# Patient Record
Sex: MALE | Race: WHITE | NOT HISPANIC OR LATINO
[De-identification: names, ages, dates, MRNs, and addresses within clinical notes are randomized per-mention and may not be internally consistent; named-entity substitution may affect disease eponyms.]

---

## 2021-01-21 ENCOUNTER — APPOINTMENT (OUTPATIENT)
Dept: INTERNAL MEDICINE | Facility: CLINIC | Age: 86
End: 2021-01-21
Payer: COMMERCIAL

## 2021-01-21 ENCOUNTER — NON-APPOINTMENT (OUTPATIENT)
Age: 86
End: 2021-01-21

## 2021-01-21 VITALS
OXYGEN SATURATION: 99 % | WEIGHT: 167 LBS | TEMPERATURE: 96.8 F | SYSTOLIC BLOOD PRESSURE: 144 MMHG | BODY MASS INDEX: 26.84 KG/M2 | HEART RATE: 70 BPM | DIASTOLIC BLOOD PRESSURE: 82 MMHG | HEIGHT: 66 IN

## 2021-01-21 VITALS — SYSTOLIC BLOOD PRESSURE: 130 MMHG | DIASTOLIC BLOOD PRESSURE: 68 MMHG

## 2021-01-21 VITALS — SYSTOLIC BLOOD PRESSURE: 150 MMHG | DIASTOLIC BLOOD PRESSURE: 80 MMHG

## 2021-01-21 DIAGNOSIS — R04.0 EPISTAXIS: ICD-10-CM

## 2021-01-21 DIAGNOSIS — Z83.79 FAMILY HISTORY OF OTHER DISEASES OF THE DIGESTIVE SYSTEM: ICD-10-CM

## 2021-01-21 DIAGNOSIS — K57.32 DIVERTICULITIS OF LARGE INTESTINE W/OUT PERFORATION OR ABSCESS W/OUT BLEEDING: ICD-10-CM

## 2021-01-21 PROCEDURE — G0438: CPT | Mod: 25

## 2021-01-21 PROCEDURE — G0102: CPT

## 2021-01-21 PROCEDURE — 99072 ADDL SUPL MATRL&STAF TM PHE: CPT

## 2021-01-21 PROCEDURE — 93000 ELECTROCARDIOGRAM COMPLETE: CPT

## 2021-01-21 PROCEDURE — 36415 COLL VENOUS BLD VENIPUNCTURE: CPT

## 2021-01-21 NOTE — PHYSICAL EXAM
[Normal Sphincter Tone] : normal sphincter tone [No Mass] : no mass [Stool Occult Blood] : stool positive for occult blood [No Acute Distress] : no acute distress [Well Nourished] : well nourished [Well Developed] : well developed [Normal Sclera/Conjunctiva] : normal sclera/conjunctiva [Well-Appearing] : well-appearing [PERRL] : pupils equal round and reactive to light [EOMI] : extraocular movements intact [Normal Oropharynx] : the oropharynx was normal [Normal Outer Ear/Nose] : the outer ears and nose were normal in appearance [No JVD] : no jugular venous distention [No Lymphadenopathy] : no lymphadenopathy [Supple] : supple [Thyroid Normal, No Nodules] : the thyroid was normal and there were no nodules present [No Respiratory Distress] : no respiratory distress  [No Accessory Muscle Use] : no accessory muscle use [Normal Rate] : normal rate  [Clear to Auscultation] : lungs were clear to auscultation bilaterally [Regular Rhythm] : with a regular rhythm [Normal S1, S2] : normal S1 and S2 [No Murmur] : no murmur heard [No Carotid Bruits] : no carotid bruits [No Abdominal Bruit] : a ~M bruit was not heard ~T in the abdomen [No Varicosities] : no varicosities [Pedal Pulses Present] : the pedal pulses are present [No Edema] : there was no peripheral edema [No Palpable Aorta] : no palpable aorta [No Extremity Clubbing/Cyanosis] : no extremity clubbing/cyanosis [Soft] : abdomen soft [Non Tender] : non-tender [Non-distended] : non-distended [No Masses] : no abdominal mass palpated [No HSM] : no HSM [Normal Bowel Sounds] : normal bowel sounds [Anus Abnormality] : the anus and perineum were normal [Prostate Enlargement] : the prostate was not enlarged [Prostate Tenderness] : the prostate was not tender [No Prostate Nodules] : no prostate nodules [Normal Posterior Cervical Nodes] : no posterior cervical lymphadenopathy [Normal Anterior Cervical Nodes] : no anterior cervical lymphadenopathy [No CVA Tenderness] : no CVA  tenderness [No Spinal Tenderness] : no spinal tenderness [No Joint Swelling] : no joint swelling [Grossly Normal Strength/Tone] : grossly normal strength/tone [No Rash] : no rash [Coordination Grossly Intact] : coordination grossly intact [No Focal Deficits] : no focal deficits [Normal Gait] : normal gait [Deep Tendon Reflexes (DTR)] : deep tendon reflexes were 2+ and symmetric [Normal Affect] : the affect was normal [Normal Insight/Judgement] : insight and judgment were intact [FreeTextEntry1] : external hemhorroid

## 2021-01-21 NOTE — HEALTH RISK ASSESSMENT
[Yes] : Yes [4 or more  times a week (4 pts)] : 4 or more  times a week (4 points) [1 or 2 (0 pts)] : 1 or 2 (0 points) [Never (0 pts)] : Never (0 points) [No] : In the past 12 months have you used drugs other than those required for medical reasons? No [0] : 2) Feeling down, depressed, or hopeless: Not at all (0) [Smoke Detector] : smoke detector [Carbon Monoxide Detector] : carbon monoxide detector [Sunscreen] : uses sunscreen [Seat Belt] :  uses seat belt [No falls in past year] : Patient reported no falls in the past year [With Significant Other] : lives with significant other [Fully functional (bathing, dressing, toileting, transferring, walking, feeding)] : Fully functional (bathing, dressing, toileting, transferring, walking, feeding) [Fully functional (using the telephone, shopping, preparing meals, housekeeping, doing laundry, using] : Fully functional and needs no help or supervision to perform IADLs (using the telephone, shopping, preparing meals, housekeeping, doing laundry, using transportation, managing medications and managing finances) [With Patient/Caregiver] : With Patient/Caregiver [] : No [de-identified] : not as regular [de-identified] : reguy [JEX0Bxwlj] : 0 [EyeExamDate] : 10/2020 [Change in mental status noted] : No change in mental status noted [Guns at Home] : no guns at home [AdvancecareDate] : 1/21/21

## 2021-01-21 NOTE — HISTORY OF PRESENT ILLNESS
[FreeTextEntry1] : Pt here for check up [de-identified] : Relates recent nose bleeds and fleeting scalp pain in the last  few days

## 2021-01-25 LAB
25(OH)D3 SERPL-MCNC: 40.9 NG/ML
ALBUMIN SERPL ELPH-MCNC: 4.6 G/DL
ALP BLD-CCNC: 55 U/L
ALT SERPL-CCNC: 16 U/L
ANION GAP SERPL CALC-SCNC: 19 MMOL/L
APPEARANCE: CLEAR
AST SERPL-CCNC: 15 U/L
BACTERIA: NEGATIVE
BASOPHILS # BLD AUTO: 0.05 K/UL
BASOPHILS NFR BLD AUTO: 0.7 %
BILIRUB SERPL-MCNC: 0.4 MG/DL
BILIRUBIN URINE: NEGATIVE
BLOOD URINE: NEGATIVE
BUN SERPL-MCNC: 23 MG/DL
CALCIUM SERPL-MCNC: 9.9 MG/DL
CHLORIDE SERPL-SCNC: 105 MMOL/L
CHOLEST SERPL-MCNC: 172 MG/DL
CO2 SERPL-SCNC: 20 MMOL/L
COLOR: YELLOW
CREAT SERPL-MCNC: 1.64 MG/DL
EOSINOPHIL # BLD AUTO: 0.08 K/UL
EOSINOPHIL NFR BLD AUTO: 1.1 %
ESTIMATED AVERAGE GLUCOSE: 120 MG/DL
GLUCOSE QUALITATIVE U: NEGATIVE
GLUCOSE SERPL-MCNC: 72 MG/DL
HBA1C MFR BLD HPLC: 5.8 %
HCT VFR BLD CALC: 50.4 %
HDLC SERPL-MCNC: 64 MG/DL
HGB BLD-MCNC: 15.9 G/DL
HYALINE CASTS: 0 /LPF
IMM GRANULOCYTES NFR BLD AUTO: 0.6 %
KETONES URINE: NEGATIVE
LDLC SERPL CALC-MCNC: 86 MG/DL
LEUKOCYTE ESTERASE URINE: NEGATIVE
LYMPHOCYTES # BLD AUTO: 1.02 K/UL
LYMPHOCYTES NFR BLD AUTO: 14.5 %
MAN DIFF?: NORMAL
MCHC RBC-ENTMCNC: 30.3 PG
MCHC RBC-ENTMCNC: 31.5 GM/DL
MCV RBC AUTO: 96.2 FL
MICROSCOPIC-UA: NORMAL
MONOCYTES # BLD AUTO: 0.83 K/UL
MONOCYTES NFR BLD AUTO: 11.8 %
NEUTROPHILS # BLD AUTO: 5.01 K/UL
NEUTROPHILS NFR BLD AUTO: 71.3 %
NITRITE URINE: NEGATIVE
NONHDLC SERPL-MCNC: 109 MG/DL
PH URINE: 7
PLATELET # BLD AUTO: 257 K/UL
POTASSIUM SERPL-SCNC: 4.7 MMOL/L
POTASSIUM SERPL-SCNC: 4.7 MMOL/L
PROT SERPL-MCNC: 6.7 G/DL
PROTEIN URINE: NORMAL
PSA SERPL-MCNC: 11.3 NG/ML
RBC # BLD: 5.24 M/UL
RBC # FLD: 13.2 %
RED BLOOD CELLS URINE: 0 /HPF
SODIUM SERPL-SCNC: 144 MMOL/L
SPECIFIC GRAVITY URINE: 1.02
SQUAMOUS EPITHELIAL CELLS: 0 /HPF
T4 SERPL-MCNC: 7.2 UG/DL
TRIGL SERPL-MCNC: 115 MG/DL
TSH SERPL-ACNC: 0.92 UIU/ML
UROBILINOGEN URINE: NORMAL
WBC # FLD AUTO: 7.03 K/UL
WHITE BLOOD CELLS URINE: 0 /HPF

## 2021-04-21 ENCOUNTER — APPOINTMENT (OUTPATIENT)
Dept: INTERNAL MEDICINE | Facility: CLINIC | Age: 86
End: 2021-04-21

## 2021-09-13 ENCOUNTER — TRANSCRIPTION ENCOUNTER (OUTPATIENT)
Age: 86
End: 2021-09-13

## 2022-07-28 ENCOUNTER — APPOINTMENT (OUTPATIENT)
Dept: INTERNAL MEDICINE | Facility: CLINIC | Age: 87
End: 2022-07-28

## 2022-07-28 ENCOUNTER — NON-APPOINTMENT (OUTPATIENT)
Age: 87
End: 2022-07-28

## 2022-07-28 VITALS
DIASTOLIC BLOOD PRESSURE: 72 MMHG | SYSTOLIC BLOOD PRESSURE: 126 MMHG | OXYGEN SATURATION: 98 % | BODY MASS INDEX: 24.75 KG/M2 | TEMPERATURE: 97.1 F | HEIGHT: 66 IN | HEART RATE: 51 BPM | WEIGHT: 154 LBS

## 2022-07-28 DIAGNOSIS — Z23 ENCOUNTER FOR IMMUNIZATION: ICD-10-CM

## 2022-07-28 DIAGNOSIS — R51.9 HEADACHE, UNSPECIFIED: ICD-10-CM

## 2022-07-28 DIAGNOSIS — Z87.448 PERSONAL HISTORY OF OTHER DISEASES OF URINARY SYSTEM: ICD-10-CM

## 2022-07-28 PROCEDURE — 99397 PER PM REEVAL EST PAT 65+ YR: CPT | Mod: 25

## 2022-07-28 PROCEDURE — G0444 DEPRESSION SCREEN ANNUAL: CPT | Mod: 59

## 2022-07-28 PROCEDURE — G0102: CPT

## 2022-07-28 PROCEDURE — 90750 HZV VACC RECOMBINANT IM: CPT

## 2022-07-28 PROCEDURE — 93000 ELECTROCARDIOGRAM COMPLETE: CPT | Mod: 59

## 2022-07-28 PROCEDURE — 90471 IMMUNIZATION ADMIN: CPT

## 2022-07-28 PROCEDURE — 36415 COLL VENOUS BLD VENIPUNCTURE: CPT

## 2022-07-28 PROCEDURE — 99072 ADDL SUPL MATRL&STAF TM PHE: CPT

## 2022-07-28 PROCEDURE — 82271 OCCULT BLOOD OTHER SOURCES: CPT | Mod: QW

## 2022-07-28 RX ORDER — AMLODIPINE BESYLATE 5 MG/1
5 TABLET ORAL
Qty: 90 | Refills: 0 | Status: DISCONTINUED | COMMUNITY
Start: 2021-01-21 | End: 2022-07-28

## 2022-07-28 RX ORDER — ROSUVASTATIN CALCIUM 10 MG/1
10 TABLET, FILM COATED ORAL
Qty: 90 | Refills: 0 | Status: DISCONTINUED | COMMUNITY
Start: 2021-01-21 | End: 2022-07-28

## 2022-07-28 NOTE — HEALTH RISK ASSESSMENT
[Never] : Never [Yes] : Yes [2 - 4 times a month (2 pts)] : 2-4 times a month (2 points) [1 or 2 (0 pts)] : 1 or 2 (0 points) [Never (0 pts)] : Never (0 points) [No] : In the past 12 months have you used drugs other than those required for medical reasons? No [0] : 2) Feeling down, depressed, or hopeless: Not at all (0) [No falls in past year] : Patient reported no falls in the past year [PHQ-2 Negative - No further assessment needed] : PHQ-2 Negative - No further assessment needed [None] : None [] :  [Smoke Detector] : smoke detector [Carbon Monoxide Detector] : carbon monoxide detector [Seat Belt] :  uses seat belt [Sunscreen] : uses sunscreen [With Patient/Caregiver] : , with patient/caregiver [Patient reported colonoscopy was normal] : Patient reported colonoscopy was normal [de-identified] : treadmilll   [de-identified] : reg [AED0Nnhos] : 0 [EyeExamDate] : 01/2022 [Change in mental status noted] : No change in mental status noted [ColonoscopyDate] : 1/1/17 [ColonoscopyComments] : polyp [AdvancecareDate] : 7/2022

## 2022-07-28 NOTE — ASSESSMENT
[FreeTextEntry1] : gett tdap and pneumonia shot dates. \par return for second shingrx 2-6 months.\par All preventative measures were reviewed with the patient and the patient is due for and agrees to the following as outlined  in the plan  below.\par

## 2022-07-28 NOTE — PHYSICAL EXAM
[No Acute Distress] : no acute distress [Well Nourished] : well nourished [Well Developed] : well developed [Well-Appearing] : well-appearing [Normal Sclera/Conjunctiva] : normal sclera/conjunctiva [PERRL] : pupils equal round and reactive to light [EOMI] : extraocular movements intact [Normal Outer Ear/Nose] : the outer ears and nose were normal in appearance [Normal Oropharynx] : the oropharynx was normal [No JVD] : no jugular venous distention [No Lymphadenopathy] : no lymphadenopathy [Supple] : supple [No Respiratory Distress] : no respiratory distress  [No Accessory Muscle Use] : no accessory muscle use [Clear to Auscultation] : lungs were clear to auscultation bilaterally [Normal Rate] : normal rate  [Regular Rhythm] : with a regular rhythm [Normal S1, S2] : normal S1 and S2 [No Murmur] : no murmur heard [No Carotid Bruits] : no carotid bruits [No Abdominal Bruit] : a ~M bruit was not heard ~T in the abdomen [No Varicosities] : no varicosities [Pedal Pulses Present] : the pedal pulses are present [No Edema] : there was no peripheral edema [No Palpable Aorta] : no palpable aorta [No Extremity Clubbing/Cyanosis] : no extremity clubbing/cyanosis [Soft] : abdomen soft [Non Tender] : non-tender [Non-distended] : non-distended [No Masses] : no abdominal mass palpated [No HSM] : no HSM [Normal Bowel Sounds] : normal bowel sounds [No Mass] : no mass [Normal Posterior Cervical Nodes] : no posterior cervical lymphadenopathy [Normal Anterior Cervical Nodes] : no anterior cervical lymphadenopathy [No CVA Tenderness] : no CVA  tenderness [No Spinal Tenderness] : no spinal tenderness [No Joint Swelling] : no joint swelling [Grossly Normal Strength/Tone] : grossly normal strength/tone [No Rash] : no rash [Coordination Grossly Intact] : coordination grossly intact [No Focal Deficits] : no focal deficits [Normal Gait] : normal gait [Deep Tendon Reflexes (DTR)] : deep tendon reflexes were 2+ and symmetric [Normal Affect] : the affect was normal [Normal Insight/Judgement] : insight and judgment were intact [Stool Occult Blood] : stool negative for occult blood [de-identified] : thyroid enlarged [FreeTextEntry1] : enlarged non tender prostate

## 2022-07-31 LAB
ALBUMIN SERPL ELPH-MCNC: 4.9 G/DL
AMYLASE/CREAT SERPL: 162 U/L
ANION GAP SERPL CALC-SCNC: 12 MMOL/L
BUN SERPL-MCNC: 25 MG/DL
CALCIUM SERPL-MCNC: 9.8 MG/DL
CHLORIDE SERPL-SCNC: 103 MMOL/L
CO2 SERPL-SCNC: 26 MMOL/L
CREAT SERPL-MCNC: 1.59 MG/DL
EGFR: 42 ML/MIN/1.73M2
GLUCOSE SERPL-MCNC: 85 MG/DL
LPL SERPL-CCNC: 58 U/L
PHOSPHATE SERPL-MCNC: 3.1 MG/DL
POTASSIUM SERPL-SCNC: 4 MMOL/L
SODIUM SERPL-SCNC: 141 MMOL/L

## 2022-08-01 ENCOUNTER — NON-APPOINTMENT (OUTPATIENT)
Age: 87
End: 2022-08-01

## 2022-08-03 LAB — HBA1C MFR BLD HPLC: 6

## 2022-08-25 ENCOUNTER — NON-APPOINTMENT (OUTPATIENT)
Age: 87
End: 2022-08-25

## 2022-08-26 ENCOUNTER — APPOINTMENT (OUTPATIENT)
Age: 87
End: 2022-08-26

## 2022-08-26 PROCEDURE — 99441: CPT

## 2022-08-26 RX ORDER — AMOXICILLIN 500 MG/1
500 CAPSULE ORAL
Qty: 20 | Refills: 0 | Status: COMPLETED | COMMUNITY
Start: 2022-02-22 | End: 2022-08-26

## 2022-08-26 RX ORDER — TIMOLOL MALEATE 5 MG/ML
0.5 SOLUTION OPHTHALMIC
Qty: 5 | Refills: 0 | Status: COMPLETED | COMMUNITY
Start: 2022-02-24 | End: 2022-08-26

## 2022-09-14 ENCOUNTER — NON-APPOINTMENT (OUTPATIENT)
Age: 87
End: 2022-09-14

## 2022-10-17 ENCOUNTER — NON-APPOINTMENT (OUTPATIENT)
Age: 87
End: 2022-10-17

## 2022-10-17 ENCOUNTER — APPOINTMENT (OUTPATIENT)
Dept: UROLOGY | Facility: CLINIC | Age: 87
End: 2022-10-17

## 2022-10-17 DIAGNOSIS — Z86.69 PERSONAL HISTORY OF OTHER DISEASES OF THE NERVOUS SYSTEM AND SENSE ORGANS: ICD-10-CM

## 2022-10-17 DIAGNOSIS — Z78.9 OTHER SPECIFIED HEALTH STATUS: ICD-10-CM

## 2022-10-17 DIAGNOSIS — Z86.79 PERSONAL HISTORY OF OTHER DISEASES OF THE CIRCULATORY SYSTEM: ICD-10-CM

## 2022-10-17 DIAGNOSIS — Z80.9 FAMILY HISTORY OF MALIGNANT NEOPLASM, UNSPECIFIED: ICD-10-CM

## 2022-10-17 PROCEDURE — 99204 OFFICE O/P NEW MOD 45 MIN: CPT | Mod: 95

## 2022-10-17 RX ORDER — NIRMATRELVIR AND RITONAVIR 300-100 MG
20 X 150 MG & KIT ORAL
Qty: 20 | Refills: 0 | Status: COMPLETED | COMMUNITY
Start: 2022-08-26 | End: 2022-10-17

## 2022-10-17 NOTE — HISTORY OF PRESENT ILLNESS
[FreeTextEntry1] : Dear Dr. Mark Mcclelland (PCP) [facility]\par \par Thank you so much for the referral to help care for your patient.\par \par Chief Complaint: Elevated PSA\par Date of first visit: 10/17/2022\par Occupation: \par \par JAZZMINE WELDON  is a 87 year old  male with PMHx of HTN, HLD, and glaucoma, who presents for elevated PSA. His PSA is 12.3 ng/ml. Per patient, his PSA "has been elevated for years", but he does not remember any values. He was being followed by a urologist for his chronically elevated PSA values, but "he has not seen that doctor in years". An MRI done in 2019 was read as PIRADS 1. His PSAD is normal (0.12 ng/ml/cc). He is biopsy naive. He denies a family history of  malignancies. \par \par He offers minimal urinary complaints. He denies frequency, urgency, and incomplete bladder emptying. No leakage. He feels like his urinary stream has become weaker over the last few weeks. Nocturia x 2-3. He denies dysuria and hematuria. \par \par \par PSA History\par 12.3 ng/ml Free PSA- 19% 06/27/22\par 10.30 ng/ml Free PSA- 24% 03/16/22 \par 11.30 ng/ml 01/21/21\par \par MRI History\par MRI 08/21/2019.  98 cc prostate, MRI read as PIRADS 1.  No LAD No EPE, No Bony Lesions. The clinical implications discussed with the patient.\par \par \par Biopsy History \par N/A\par \par The patient denies fevers, chills, nausea and or vomiting and no unexplained weight loss.\par \par All pertinent laboratory results and physician notes were reviewed.  \par \par Prostate cancer screening: the patient and I spoke at length about prostate cancer screening, its risks and its benefits. The patient has 2 (age, elevated PSA) risk factors for prostate cancer.  He understands that many men with prostate cancer will die with the disease rather than of it and we also discussed the results large multi-center American and  prostate cancer screening trials. He also understands that PSA in and of itself does not diagnose prostate cancer but only assesses risk to a certain degree. The patient understands that to definitively screen for prostate cancer, a biopsy is required and this procedure has risks, including bleeding, infection, ED and urinary retention. The patient opted to proceed with a prostate MRI.\par

## 2022-10-17 NOTE — ASSESSMENT
[FreeTextEntry1] : 87 year old  male with PMHx of HTN, HLD, and glaucoma, who presents for elevated PSA. His PSA is 12.3 ng/ml. An MRI done in 2019 was read as PIRADS 1. His PSAD is normal (0.12 ng/ml/cc). He is biopsy naive. He denies a family history of  malignancies. Minimal LUTS.\par \par 1. Need to obtain MRI images from 2019 and upload to PACS. \par 2. Obtain Prostate MRI. Need prostate MRI for fusion biopsy.\par 3. Schedule MRI, MRI review appointment, TP OR biopsy, and post op follow up appointments with Dr. Cain.\par \par He understands that many men with prostate cancer will die with the disease rather than of it and we also discussed the results large multi-center American and  prostate cancer screening trials. He also understands that PSA in and of itself does not diagnose prostate cancer but only assesses risk to a certain degree. The patient understands that to definitively screen for prostate cancer, a biopsy is required and this procedure has risks, including bleeding, infection, ED and urinary retention. The patient opted to move forward with a prostate MRI.\par \par The patient is aware to expect hematuria x 2 weeks and up to 4 weeks of hematospermia.  There is a risk of infection albeit much lower than a transrectal approach. In some cases, patients can experience erectile dysfunction but this is usually self limiting.  Any fever/chills after the biopsy, the patient is to contact the office and go to the ER for an immediate evaluation. He has been given paper instructions outlining these items - which includes medications to avoid prior to surgery.\par \par \par 1. CBC, BMP, PSA, Covid Test, UA UCx, EKG, echo report\par 2. Medical and Cardiac Clearance\par 3. TP biopsy at [facility]\par 4. Follow up 2 weeks after biopsy with his primary urologist or ourselves.\par 5. We will call with the path results once they are resulted.\par \par Follow up in office for MRI review/physical exam prior to biopsy date given.\par \par Carmen Sharma NP was present and available for consult for the entirety of this visit.\par

## 2022-10-24 ENCOUNTER — APPOINTMENT (OUTPATIENT)
Dept: UROLOGY | Facility: CLINIC | Age: 87
End: 2022-10-24

## 2022-10-27 ENCOUNTER — OUTPATIENT (OUTPATIENT)
Dept: OUTPATIENT SERVICES | Facility: HOSPITAL | Age: 87
LOS: 1 days | End: 2022-10-27
Payer: COMMERCIAL

## 2022-10-27 ENCOUNTER — APPOINTMENT (OUTPATIENT)
Dept: MRI IMAGING | Facility: HOSPITAL | Age: 87
End: 2022-10-27

## 2022-10-27 PROCEDURE — 72197 MRI PELVIS W/O & W/DYE: CPT | Mod: 26

## 2022-10-27 PROCEDURE — 72197 MRI PELVIS W/O & W/DYE: CPT

## 2022-10-27 PROCEDURE — A9585: CPT

## 2022-11-02 ENCOUNTER — APPOINTMENT (OUTPATIENT)
Dept: UROLOGY | Facility: CLINIC | Age: 87
End: 2022-11-02

## 2022-11-02 VITALS
HEART RATE: 70 BPM | HEIGHT: 66 IN | DIASTOLIC BLOOD PRESSURE: 80 MMHG | SYSTOLIC BLOOD PRESSURE: 145 MMHG | WEIGHT: 148 LBS | TEMPERATURE: 98.1 F | OXYGEN SATURATION: 97 % | BODY MASS INDEX: 23.78 KG/M2

## 2022-11-02 PROCEDURE — 99072 ADDL SUPL MATRL&STAF TM PHE: CPT

## 2022-11-02 PROCEDURE — 99214 OFFICE O/P EST MOD 30 MIN: CPT

## 2022-11-02 NOTE — PHYSICAL EXAM
[General Appearance - Well Developed] : well developed [General Appearance - Well Nourished] : well nourished [Abdomen Soft] : soft [Edema] : no peripheral edema [] : no respiratory distress

## 2022-11-03 NOTE — ASSESSMENT
[FreeTextEntry1] : 87 year old  male with PMHx of HTN, HLD, and glaucoma, who presents for elevated PSA. His PSA is 12.3 ng/ml. An MRI done in 2019 was read as PIRADS 1. His PSAD is normal (0.12 ng/ml/cc). He is biopsy naive. He denies a family history of  malignancies. Minimal LUTS.\par \par The patient was referred for a PSA > 10, however there were no findings on the MRI and therefore low risk for CaP.  We dicsussed pros and cons of screening and we will not be screening.\par \par 1. MRI Negative\par \par The prostate MRI has been reviewed with the patient (images and report).  There are no suspicious lesions on 2nd look.  Reviewing multiple studies the probability of having prostate cancer with a negative prostate MRI is ~ 20%.  However, the probability of having clinically significant disease is <5% of those positive.  A recent study from [facility] presented level 1b evidence that a MRI can help avoid patients biopsies and only misses ~ 3% of clinically significant disease.  \par \par I have discussed these details at length with the patient.  He is aware of the pro's and con's of prostate cancer screening.  Taking into account his PSA, Family History and LISA findings.  He wishes at this time to avoid a biopsy and will continue to undergo PSA based screening. If the PSA continues to increase or there is increasing clinical suspicion we will repeat MR imaging of the prostate prior to any intervention, due to risks associated with the prostate biopsy. The patient understands that a prostate biopsy is still the standard of care with regards to screening and MRI is an adjunct that can help localize and target more suspicious areas.  In conclusion, he would like to hold off on the biopsy at this time.\par \par 2. BPH - no symptoms.\par \par Thank you very much for allowing me to assist in the care of this patient. Should you have any additional questions or concerns please do not hesitate to contact me.\par \par \par Sincerely,\par \par \par Jm Cain D.O.\par  of Urology and Radiology\par  of Urology at [facility]\par System Director for Prostate Cancer\par [address] [place] [address], 5th Floor [facility], [address]\par Phone: [phone]\par

## 2022-11-03 NOTE — HISTORY OF PRESENT ILLNESS
[FreeTextEntry1] : Dear Dr. Mark Mcclelland (PCP) [facility]\par Dear Dr. Kitty Pace ([facility]) - [address]\par \par Thank you so much for the referral to help care for your patient.\par \par Chief Complaint: Elevated PSA\par Date of first visit: 10/17/2022\par Occupation: \par \par JAZZMINE WELDON  is a 87 year old  male with PMHx of HTN, HLD, and glaucoma, who presents for elevated PSA. His PSA is 12.3 ng/ml. Per patient, his PSA "has been elevated for years", but he does not remember any values. He was being followed by a urologist for his chronically elevated PSA values, but "he has not seen that doctor in years". An MRI done in 2019 was read as PIRADS 1. His PSAD is normal (0.12 ng/ml/cc). He is biopsy naive. He denies a family history of  malignancies. \par \par He offers minimal urinary complaints. He denies frequency, urgency, and incomplete bladder emptying. No leakage. He feels like his urinary stream has become weaker over the last few weeks. Nocturia x 2-3. He denies dysuria and hematuria. \par \par \par PSA History\par 12.3 ng/ml Free PSA- 19% 06/27/22\par 10.30 ng/ml Free PSA- 24% 03/16/22 \par 11.30 ng/ml 01/21/21\par \par MRI History\par MRI read 10/27/2022. 91.7 ml prostate with PIRADS 1 no MRI targetable lesions. No LAD No EPE, No Bony Lesions.  The images have been reviewed and clinical implications discussed with the patient.\par \par MRI 08/21/2019.  98 cc prostate, MRI read as PIRADS 1.  No LAD No EPE, No Bony Lesions. The clinical implications discussed with the patient.\par \par \par Biopsy History \par N/A\par \par 11/02/2022\par IPSS  QOL\par VIRA\par \par The patient denies fevers, chills, nausea and or vomiting and no unexplained weight loss.\par \par All pertinent laboratory results and physician notes were reviewed.  \par \par Prostate cancer screening: the patient and I spoke at length about prostate cancer screening, its risks and its benefits. The patient has 2 (age, elevated PSA) risk factors for prostate cancer.  He understands that many men with prostate cancer will die with the disease rather than of it and we also discussed the results large multi-center American and  prostate cancer screening trials. He also understands that PSA in and of itself does not diagnose prostate cancer but only assesses risk to a certain degree. The patient understands that to definitively screen for prostate cancer, a biopsy is required and this procedure has risks, including bleeding, infection, ED and urinary retention. The patient opted to proceed with a prostate MRI.\par \par I spent 31 minutes of non-face to face time reviewing the patient's records, chart, uploading processing MR images, transferring MR images from PACS to an independent workstation, reviewing images on independent workstation, speaking with their physician and planning their possible prostate biopsy and preparation of an upcoming visit for 11/02/2022 .  The imaging and planning was highly complex and took this entire time. \par \par The prostate MRI has been reviewed with the patient (images and report).  There are no suspicious lesions on 2nd look.  Reviewing multiple studies the probability of having prostate cancer with a negative prostate MRI is ~ 20%.  However, the probability of having clinically significant disease is <5% of those positive.  A recent study from [facility] presented level 1b evidence that a MRI can help avoid patients biopsies and only misses ~ 3% of clinically significant disease.  \par \par I have discussed these details at length with the patient.  He is aware of the pro's and con's of prostate cancer screening.  Taking into account his PSA, Family History and LISA findings.  He wishes at this time to avoid a biopsy and will continue to undergo PSA based screening. If the PSA continues to increase or there is increasing clinical suspicion we will repeat MR imaging of the prostate prior to any intervention, due to risks associated with the prostate biopsy. The patient understands that a prostate biopsy is still the standard of care with regards to screening and MRI is an adjunct that can help localize and target more suspicious areas.  In conclusion, he would like to hold off on the biopsy at this time.\par \par I spent 31 minutes of non-face to face time reviewing the patient's records, chart, uploading processing MR images, transferring MR images from PACS to an independent workstation, reviewing images on independent workstation, speaking with their physician and planning their possible prostate biopsy and preparation of an upcoming visit for 11/02/2022 .  The imaging and planning was highly complex and took this entire time. \par \par \par \par \par

## 2022-12-05 ENCOUNTER — APPOINTMENT (OUTPATIENT)
Dept: UROLOGY | Facility: CLINIC | Age: 87
End: 2022-12-05

## 2023-02-23 ENCOUNTER — LABORATORY RESULT (OUTPATIENT)
Age: 88
End: 2023-02-23

## 2023-02-23 ENCOUNTER — APPOINTMENT (OUTPATIENT)
Dept: NEPHROLOGY | Facility: CLINIC | Age: 88
End: 2023-02-23
Payer: MEDICARE

## 2023-02-23 VITALS — SYSTOLIC BLOOD PRESSURE: 122 MMHG | HEART RATE: 78 BPM | RESPIRATION RATE: 12 BRPM | DIASTOLIC BLOOD PRESSURE: 56 MMHG

## 2023-02-23 PROCEDURE — 99205 OFFICE O/P NEW HI 60 MIN: CPT

## 2023-02-24 NOTE — HISTORY OF PRESENT ILLNESS
[FreeTextEntry1] : Patient is an 86 yo M with HTN, PRIMO on CKD who presents for evaluation of renal insufficiency.\par \par Acute nephritis around 32, had blood in his urine[facility], unclear if got meds but the urine improved on its own. \par \par Baseline sCr 1.59-1.8 from 2020-present, fluctuations appear to be volume related\par \par Nephrologic History:\par Stones: None\par NSAID use: \par HTN: Has been on medications for >20 years, under control per records\par DM: Never\par Prior nephrologist: DId have one but does not remember name\par Kidney biopsy: None\par Reduced kidney mass (prematurity): None\par Pre-eclampsia: Male\par Urination history: Urinates a few times during they day, twice at night but gets back to sleep easily, does not see blood or foam\par \par Family Hx: No first degree relatives with kidney disease\par Surgical Hx: As recorded\par Social Hx: Never smoked, Social EtOH. \par Allergies: NKDA\par Meds:  As rec'd\par \par POCUS scalloped with decreased cortical thickness and mildly increased echogenicity, otherwise normal length no cysts or stones appreciated bilaterally.

## 2023-02-24 NOTE — ASSESSMENT
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

## 2023-02-24 NOTE — PHYSICAL EXAM
[General Appearance - Alert] : alert [General Appearance - In No Acute Distress] : in no acute distress [Sclera] : the sclera and conjunctiva were normal [PERRL With Normal Accommodation] : pupils were equal in size, round, and reactive to light [Extraocular Movements] : extraocular movements were intact [Outer Ear] : the ears and nose were normal in appearance [Oropharynx] : the oropharynx was normal [Neck Appearance] : the appearance of the neck was normal [Neck Cervical Mass (___cm)] : no neck mass was observed [Jugular Venous Distention Increased] : there was no jugular-venous distention [Respiration, Rhythm And Depth] : normal respiratory rhythm and effort [Exaggerated Use Of Accessory Muscles For Inspiration] : no accessory muscle use [Auscultation Breath Sounds / Voice Sounds] : lungs were clear to auscultation bilaterally [Heart Rate And Rhythm] : heart rate was normal and rhythm regular [Heart Sounds] : normal S1 and S2 [Heart Sounds Gallop] : no gallops [Murmurs] : no murmurs [Heart Sounds Pericardial Friction Rub] : no pericardial rub [Edema] : there was no peripheral edema [Veins - Varicosity Changes] : there were no varicosital changes [Bowel Sounds] : normal bowel sounds [Abdomen Soft] : soft [Abdomen Tenderness] : non-tender [Abdomen Mass (___ Cm)] : no abdominal mass palpated [No CVA Tenderness] : no ~M costovertebral angle tenderness [No Spinal Tenderness] : no spinal tenderness [Abnormal Walk] : normal gait [Skin Color & Pigmentation] : normal skin color and pigmentation [Skin Turgor] : normal skin turgor [] : no rash [Affect] : the affect was normal [Mood] : the mood was normal

## 2023-02-24 NOTE — CONSULT LETTER
[Dear  ___] : Dear  [unfilled], [Consult Letter:] : I had the pleasure of evaluating your patient, [unfilled]. [Please see my note below.] : Please see my note below. [Consult Closing:] : Thank you very much for allowing me to participate in the care of this patient.  If you have any questions, please do not hesitate to contact me. [FreeTextEntry1] : His CKD is more than likely form his history of HTN, leading to his fluctuations in sCr from his fluid status as he has decrease his water intake over the years. I will work up his kidney disease fully to ensure nothing else remains, but have advised him to increase his fluids for now.  [FreeTextEntry3] : Warm regards,\par Stephen Nava MD MA

## 2023-02-27 LAB
25(OH)D3 SERPL-MCNC: 41.5 NG/ML
ALBUMIN MFR SERPL ELPH: 62.9 %
ALBUMIN SERPL ELPH-MCNC: 4.3 G/DL
ALBUMIN SERPL-MCNC: 4.1 G/DL
ALBUMIN/GLOB SERPL: 1.7 RATIO
ALDOSTERONE SERUM: 21 NG/DL
ALP BLD-CCNC: 46 U/L
ALPHA1 GLOB MFR SERPL ELPH: 3.4 %
ALPHA1 GLOB SERPL ELPH-MCNC: 0.2 G/DL
ALPHA2 GLOB MFR SERPL ELPH: 10.1 %
ALPHA2 GLOB SERPL ELPH-MCNC: 0.7 G/DL
ALT SERPL-CCNC: 19 U/L
AMYLASE P SERPL-CCNC: 57 U/L
AMYLASE S SERPL-CCNC: 116 U/L
AMYLASE SERPL-CCNC: 173 U/L
AMYLASE/CREAT SERPL: 165 U/L
ANION GAP SERPL CALC-SCNC: 12 MMOL/L
APPEARANCE: CLEAR
AST SERPL-CCNC: 16 U/L
B-GLOBULIN MFR SERPL ELPH: 10.3 %
B-GLOBULIN SERPL ELPH-MCNC: 0.7 G/DL
BACTERIA: NEGATIVE
BASOPHILS # BLD AUTO: 0.04 K/UL
BASOPHILS NFR BLD AUTO: 0.5 %
BILIRUB SERPL-MCNC: 0.4 MG/DL
BILIRUBIN URINE: NEGATIVE
BLOOD URINE: NEGATIVE
BUN SERPL-MCNC: 29 MG/DL
C3 SERPL-MCNC: 101 MG/DL
C4 SERPL-MCNC: 29 MG/DL
CALCIUM SERPL-MCNC: 9.6 MG/DL
CALCIUM SERPL-MCNC: 9.6 MG/DL
CHLORIDE SERPL-SCNC: 102 MMOL/L
CO2 SERPL-SCNC: 29 MMOL/L
COLOR: YELLOW
CREAT SERPL-MCNC: 1.85 MG/DL
CREAT SPEC-SCNC: 215 MG/DL
CREAT SPEC-SCNC: 215 MG/DL
CREAT/PROT UR: 0.1 RATIO
CYSTATIN C SERPL-MCNC: 1.42 MG/L
DEPRECATED KAPPA LC FREE/LAMBDA SER: 1.64 RATIO
DSDNA AB SER-ACNC: <12 IU/ML
EGFR: 35 ML/MIN/1.73M2
EOSINOPHIL # BLD AUTO: 0.07 K/UL
EOSINOPHIL NFR BLD AUTO: 0.9 %
FERRITIN SERPL-MCNC: 67 NG/ML
GAMMA GLOB FLD ELPH-MCNC: 0.9 G/DL
GAMMA GLOB MFR SERPL ELPH: 13.3 %
GFR/BSA.PRED SERPLBLD CYS-BASED-ARV: 44 ML/MIN/1.73M2
GLUCOSE QUALITATIVE U: NEGATIVE
GLUCOSE SERPL-MCNC: 90 MG/DL
HCT VFR BLD CALC: 46.6 %
HGB BLD-MCNC: 15.4 G/DL
HYALINE CASTS: 1 /LPF
IGA SER QL IEP: 138 MG/DL
IGG SER QL IEP: 911 MG/DL
IGM SER QL IEP: 142 MG/DL
IMM GRANULOCYTES NFR BLD AUTO: 0.4 %
INTERPRETATION SERPL IEP-IMP: NORMAL
IRON SATN MFR SERPL: 26 %
IRON SERPL-MCNC: 76 UG/DL
KAPPA LC CSF-MCNC: 1.19 MG/DL
KAPPA LC SERPL-MCNC: 1.95 MG/DL
KETONES URINE: NEGATIVE
LEUKOCYTE ESTERASE URINE: NEGATIVE
LPL SERPL-CCNC: 69 U/L
LYMPHOCYTES # BLD AUTO: 1.07 K/UL
LYMPHOCYTES NFR BLD AUTO: 13.7 %
M PROTEIN MFR SERPL ELPH: NORMAL
M PROTEIN SPEC IFE-MCNC: NORMAL
MAN DIFF?: NORMAL
MCHC RBC-ENTMCNC: 30.4 PG
MCHC RBC-ENTMCNC: 33 GM/DL
MCV RBC AUTO: 91.9 FL
MICROALBUMIN 24H UR DL<=1MG/L-MCNC: 1.4 MG/DL
MICROALBUMIN/CREAT 24H UR-RTO: 7 MG/G
MICROSCOPIC-UA: NORMAL
MONOCLON BAND OBS SERPL: NORMAL
MONOCYTES # BLD AUTO: 0.81 K/UL
MONOCYTES NFR BLD AUTO: 10.4 %
NEUTROPHILS # BLD AUTO: 5.8 K/UL
NEUTROPHILS NFR BLD AUTO: 74.1 %
NITRITE URINE: NEGATIVE
OSMOLALITY SERPL: 303 MOSMOL/KG
OSMOLALITY UR: 780 MOSM/KG
PARATHYROID HORMONE INTACT: 48 PG/ML
PH URINE: 6
PHOSPHATE SERPL-MCNC: 3.5 MG/DL
PLATELET # BLD AUTO: 264 K/UL
POTASSIUM SERPL-SCNC: 3.9 MMOL/L
PROT SERPL-MCNC: 6.5 G/DL
PROT UR-MCNC: 13 MG/DL
PROTEIN URINE: NORMAL
RBC # BLD: 5.07 M/UL
RBC # FLD: 13.7 %
RED BLOOD CELLS URINE: 1 /HPF
SODIUM ?TM SUB UR QN: 79 MMOL/L
SODIUM SERPL-SCNC: 143 MMOL/L
SPECIFIC GRAVITY URINE: 1.02
SQUAMOUS EPITHELIAL CELLS: 1 /HPF
TIBC SERPL-MCNC: 294 UG/DL
UIBC SERPL-MCNC: 218 UG/DL
UROBILINOGEN URINE: NORMAL
UUN UR-MCNC: 1080 MG/DL
WBC # FLD AUTO: 7.82 K/UL
WHITE BLOOD CELLS URINE: 1 /HPF

## 2023-03-01 LAB — ANA SER IF-ACNC: NEGATIVE

## 2023-03-08 LAB — RENIN ACTIVITY, PLASMA: 2.4 NG/ML/HR

## 2023-03-17 LAB
ALBUMIN SERPL ELPH-MCNC: 4.7 G/DL
ALP BLD-CCNC: 48 U/L
ALT SERPL-CCNC: 18 U/L
ANION GAP SERPL CALC-SCNC: 15 MMOL/L
APPEARANCE: CLEAR
AST SERPL-CCNC: 21 U/L
BACTERIA: NEGATIVE
BASOPHILS # BLD AUTO: 0.05 K/UL
BASOPHILS NFR BLD AUTO: 0.7 %
BILIRUB SERPL-MCNC: 0.5 MG/DL
BILIRUBIN URINE: NEGATIVE
BLOOD URINE: NEGATIVE
BUN SERPL-MCNC: 28 MG/DL
CALCIUM SERPL-MCNC: 10.3 MG/DL
CHLORIDE SERPL-SCNC: 99 MMOL/L
CO2 SERPL-SCNC: 27 MMOL/L
COLOR: NORMAL
CREAT SERPL-MCNC: 1.72 MG/DL
CREAT SPEC-SCNC: 82 MG/DL
CREAT SPEC-SCNC: 82 MG/DL
CREAT/PROT UR: 0.1 RATIO
CYSTATIN C SERPL-MCNC: 1.46 MG/L
EGFR: 38 ML/MIN/1.73M2
EOSINOPHIL # BLD AUTO: 0.06 K/UL
EOSINOPHIL NFR BLD AUTO: 0.8 %
GFR/BSA.PRED SERPLBLD CYS-BASED-ARV: 43 ML/MIN/1.73M2
GLUCOSE QUALITATIVE U: NEGATIVE
GLUCOSE SERPL-MCNC: 76 MG/DL
HCT VFR BLD CALC: 49.2 %
HGB BLD-MCNC: 15.7 G/DL
HYALINE CASTS: 0 /LPF
IMM GRANULOCYTES NFR BLD AUTO: 0.4 %
KETONES URINE: NEGATIVE
LEUKOCYTE ESTERASE URINE: NEGATIVE
LYMPHOCYTES # BLD AUTO: 1.21 K/UL
LYMPHOCYTES NFR BLD AUTO: 16.2 %
MAN DIFF?: NORMAL
MCHC RBC-ENTMCNC: 30 PG
MCHC RBC-ENTMCNC: 31.9 GM/DL
MCV RBC AUTO: 94.1 FL
MICROALBUMIN 24H UR DL<=1MG/L-MCNC: 1.5 MG/DL
MICROALBUMIN/CREAT 24H UR-RTO: 18 MG/G
MICROSCOPIC-UA: NORMAL
MONOCYTES # BLD AUTO: 1.08 K/UL
MONOCYTES NFR BLD AUTO: 14.4 %
NEUTROPHILS # BLD AUTO: 5.06 K/UL
NEUTROPHILS NFR BLD AUTO: 67.5 %
NITRITE URINE: NEGATIVE
PH URINE: 6.5
PLATELET # BLD AUTO: 299 K/UL
POTASSIUM SERPL-SCNC: 3.9 MMOL/L
PROT SERPL-MCNC: 7 G/DL
PROT UR-MCNC: 7 MG/DL
PROTEIN URINE: NORMAL
RBC # BLD: 5.23 M/UL
RBC # FLD: 13.3 %
RED BLOOD CELLS URINE: 1 /HPF
SODIUM SERPL-SCNC: 141 MMOL/L
SPECIFIC GRAVITY URINE: 1.02
SQUAMOUS EPITHELIAL CELLS: 0 /HPF
UROBILINOGEN URINE: NORMAL
WBC # FLD AUTO: 7.49 K/UL
WHITE BLOOD CELLS URINE: 0 /HPF

## 2023-05-31 ENCOUNTER — TRANSCRIPTION ENCOUNTER (OUTPATIENT)
Age: 88
End: 2023-05-31

## 2023-05-31 ENCOUNTER — APPOINTMENT (OUTPATIENT)
Dept: NEPHROLOGY | Facility: CLINIC | Age: 88
End: 2023-05-31
Payer: MEDICARE

## 2023-05-31 VITALS
TEMPERATURE: 98.3 F | DIASTOLIC BLOOD PRESSURE: 72 MMHG | OXYGEN SATURATION: 98 % | HEART RATE: 64 BPM | SYSTOLIC BLOOD PRESSURE: 128 MMHG

## 2023-05-31 PROCEDURE — 99215 OFFICE O/P EST HI 40 MIN: CPT

## 2023-06-01 NOTE — ASSESSMENT
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 Otherwise patient feels well and no other signs/symptoms of PRIMO\par \par HTN - Controlled in office today, if CKD still progressing may pursue ABPM to ensure good control and not dropping too low\par \par RTC in 1 month, will call with results after labs in one week

## 2023-06-01 NOTE — HISTORY OF PRESENT ILLNESS
[FreeTextEntry1] : Patient is an 88 yo M with HTN, PRIMO on CKD who presents for followup of renal insufficiency\par \par Baseline sCr 1.59-1.8 from 2020-present, fluctuations appear to be volume related\par \par \par Patient with repeat sCr rise to 1.9 on may 19th labs despite religiously drinking 2 L a day\par \par Today no orthostatic hypotension however IVC completely collapsible. No change to POCUS of kidneys\par \par Will trial stopping HCTZ for one week with a BP check at home, if sCr improves with this will give a low dose losartan or some other non diuretic medication for BP control, then schedule for ABPM. - not in city for more than one day during the summer so will liekly be in the fall\par

## 2023-06-01 NOTE — HISTORY OF PRESENT ILLNESS
[FreeTextEntry1] : Patient is an 86 yo M with HTN, PRIMO on CKD who presents for followup of renal insufficiency\par \par Baseline sCr 1.59-1.8 from 2020-present, fluctuations appear to be volume related\par \par \par Patient with repeat sCr rise to 1.9 on may 19th labs despite religiously drinking 2 L a day\par \par Today no orthostatic hypotension however IVC completely collapsible. No change to POCUS of kidneys\par \par Will trial stopping HCTZ for one week with a BP check at home, if sCr improves with this will give a low dose losartan or some other non diuretic medication for BP control, then schedule for ABPM. - not in city for more than one day during the summer so will liekly be in the fall\par

## 2023-06-01 NOTE — ASSESSMENT
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 Otherwise patient feels well and no other signs/symptoms of PRIMO\par \par HTN - Controlled in office today, if CKD still progressing may pursue ABPM to ensure good control and not dropping too low\par \par RTC in 1 month, will call with results after labs in one week

## 2023-06-19 LAB
ALBUMIN SERPL ELPH-MCNC: 4.2 G/DL
ALDOSTERONE SERUM: 20.9 NG/DL
ALP BLD-CCNC: 49 U/L
ALT SERPL-CCNC: 14 U/L
ANION GAP SERPL CALC-SCNC: 12 MMOL/L
APPEARANCE: CLEAR
AST SERPL-CCNC: 15 U/L
BACTERIA: NEGATIVE /HPF
BILIRUB SERPL-MCNC: 0.4 MG/DL
BILIRUBIN URINE: NEGATIVE
BLOOD URINE: NEGATIVE
BUN SERPL-MCNC: 28 MG/DL
CALCIUM SERPL-MCNC: 9.5 MG/DL
CALCIUM SERPL-MCNC: 9.5 MG/DL
CAST: 0 /LPF
CHLORIDE SERPL-SCNC: 107 MMOL/L
CO2 SERPL-SCNC: 24 MMOL/L
COLOR: YELLOW
CREAT SERPL-MCNC: 1.75 MG/DL
CREAT SPEC-SCNC: 101 MG/DL
CREAT SPEC-SCNC: 101 MG/DL
CREAT/PROT UR: 0.1 RATIO
CYSTATIN C SERPL-MCNC: 1.38 MG/L
EGFR: 38 ML/MIN/1.73M2
EPITHELIAL CELLS: 0 /HPF
GFR/BSA.PRED SERPLBLD CYS-BASED-ARV: 46 ML/MIN/1.73M2
GLUCOSE QUALITATIVE U: NEGATIVE MG/DL
GLUCOSE SERPL-MCNC: 96 MG/DL
KETONES URINE: NEGATIVE MG/DL
LEUKOCYTE ESTERASE URINE: NEGATIVE
MICROALBUMIN 24H UR DL<=1MG/L-MCNC: <1.2 MG/DL
MICROALBUMIN/CREAT 24H UR-RTO: NORMAL MG/G
MICROSCOPIC-UA: NORMAL
NITRITE URINE: NEGATIVE
PARATHYROID HORMONE INTACT: 47 PG/ML
PH URINE: 6
PHOSPHATE SERPL-MCNC: 3.3 MG/DL
POTASSIUM SERPL-SCNC: 4.7 MMOL/L
PROT SERPL-MCNC: 6.4 G/DL
PROT UR-MCNC: 9 MG/DL
PROTEIN URINE: NEGATIVE MG/DL
RED BLOOD CELLS URINE: 1 /HPF
SODIUM ?TM SUB UR QN: 33 MMOL/L
SODIUM SERPL-SCNC: 144 MMOL/L
SPECIFIC GRAVITY URINE: 1.02
UROBILINOGEN URINE: 0.2 MG/DL
WHITE BLOOD CELLS URINE: 0 /HPF

## 2023-06-26 LAB — RENIN ACTIVITY, PLASMA: 1.01 NG/ML/HR

## 2023-07-11 ENCOUNTER — NON-APPOINTMENT (OUTPATIENT)
Age: 88
End: 2023-07-11

## 2023-07-11 ENCOUNTER — APPOINTMENT (OUTPATIENT)
Dept: CARDIOLOGY | Facility: CLINIC | Age: 88
End: 2023-07-11
Payer: MEDICARE

## 2023-07-11 VITALS
OXYGEN SATURATION: 98 % | DIASTOLIC BLOOD PRESSURE: 60 MMHG | WEIGHT: 154 LBS | BODY MASS INDEX: 24.86 KG/M2 | HEART RATE: 65 BPM | SYSTOLIC BLOOD PRESSURE: 122 MMHG

## 2023-07-11 DIAGNOSIS — R09.89 OTHER SPECIFIED SYMPTOMS AND SIGNS INVOLVING THE CIRCULATORY AND RESPIRATORY SYSTEMS: ICD-10-CM

## 2023-07-11 PROCEDURE — 93000 ELECTROCARDIOGRAM COMPLETE: CPT

## 2023-07-11 PROCEDURE — 99204 OFFICE O/P NEW MOD 45 MIN: CPT

## 2023-07-11 NOTE — HISTORY OF PRESENT ILLNESS
[FreeTextEntry1] : Leonard Vasquez is an 87-year-old man with a history of hypertension, chronic kidney disease, hyperlipidemia, and abnormal ECG (first-degree AV block and right bundle branch block), who presents for cardiology consultation.\par \par He describes an excellent baseline functional status and exercises on a treadmill several days per week.  He has not been experiencing chest discomfort, difficulty breathing, palpitations, or syncope.   He presents for cardiology consultation at the recommendation of his primary care physician because of risk factors for heart disease.  He has no complaints today.

## 2023-07-11 NOTE — DISCUSSION/SUMMARY
[FreeTextEntry1] : \par Hypertension: Controlled;  Continue amlodipine.\par \par Hypercholesterolemia: Controlled; continue rosuvastatin 20 mg daily.\par \par Abnormal ECG: Right bundle branch block and first-degree AV block are pre-existing; today's ECG is similar to a previous tracing  from July 2022.  I recommend echocardiography.

## 2023-07-11 NOTE — PHYSICAL EXAM
[Normal S1, S2] : normal S1, S2 [No Murmur] : no murmur [Clear Lung Fields] : clear lung fields [Good Air Entry] : good air entry [Soft] : abdomen soft [Normal Gait] : normal gait [Moves all extremities] : moves all extremities [Normal Speech] : normal speech [Alert and Oriented] : alert and oriented [de-identified] :   Appears his stated age and well [de-identified] :  wearing eyeglasses [de-identified] :  normocephalic [de-identified] :  no JVD [de-identified] :  skin is warm and dry

## 2023-07-11 NOTE — CARDIOLOGY SUMMARY
[de-identified] : 7/11/2023.  Sinus  Rhythm with first degree A-V block.   Right bundle branch block.

## 2023-07-11 NOTE — REASON FOR VISIT
[CV Risk Factors and Non-Cardiac Disease] : CV risk factors and non-cardiac disease [Hyperlipidemia] : hyperlipidemia [Hypertension] : hypertension [Spouse] : spouse

## 2023-07-24 ENCOUNTER — APPOINTMENT (OUTPATIENT)
Dept: INTERNAL MEDICINE | Facility: CLINIC | Age: 88
End: 2023-07-24
Payer: MEDICARE

## 2023-07-24 VITALS
BODY MASS INDEX: 23.95 KG/M2 | HEART RATE: 53 BPM | DIASTOLIC BLOOD PRESSURE: 70 MMHG | SYSTOLIC BLOOD PRESSURE: 120 MMHG | TEMPERATURE: 97.9 F | OXYGEN SATURATION: 99 % | WEIGHT: 149 LBS | HEIGHT: 66 IN

## 2023-07-24 VITALS — DIASTOLIC BLOOD PRESSURE: 68 MMHG | SYSTOLIC BLOOD PRESSURE: 120 MMHG

## 2023-07-24 DIAGNOSIS — Z86.39 PERSONAL HISTORY OF OTHER ENDOCRINE, NUTRITIONAL AND METABOLIC DISEASE: ICD-10-CM

## 2023-07-24 DIAGNOSIS — Z87.898 PERSONAL HISTORY OF OTHER SPECIFIED CONDITIONS: ICD-10-CM

## 2023-07-24 DIAGNOSIS — J39.2 OTHER DISEASES OF PHARYNX: ICD-10-CM

## 2023-07-24 DIAGNOSIS — Z00.00 ENCOUNTER FOR GENERAL ADULT MEDICAL EXAMINATION W/OUT ABNORMAL FINDINGS: ICD-10-CM

## 2023-07-24 DIAGNOSIS — H40.9 UNSPECIFIED GLAUCOMA: ICD-10-CM

## 2023-07-24 DIAGNOSIS — K63.5 POLYP OF COLON: ICD-10-CM

## 2023-07-24 DIAGNOSIS — U07.1 COVID-19: ICD-10-CM

## 2023-07-24 DIAGNOSIS — E04.1 NONTOXIC SINGLE THYROID NODULE: ICD-10-CM

## 2023-07-24 DIAGNOSIS — R97.20 ELEVATED PROSTATE, SPECIFIC ANTIGEN [PSA]: ICD-10-CM

## 2023-07-24 DIAGNOSIS — R74.8 ABNORMAL LEVELS OF OTHER SERUM ENZYMES: ICD-10-CM

## 2023-07-24 DIAGNOSIS — R19.5 OTHER FECAL ABNORMALITIES: ICD-10-CM

## 2023-07-24 DIAGNOSIS — N28.9 DISORDER OF KIDNEY AND URETER, UNSPECIFIED: ICD-10-CM

## 2023-07-24 PROCEDURE — G0439: CPT

## 2023-07-24 NOTE — HEALTH RISK ASSESSMENT
[Yes] : Yes [2 - 4 times a month (2 pts)] : 2-4 times a month (2 points) [1 or 2 (0 pts)] : 1 or 2 (0 points) [Never (0 pts)] : Never (0 points) [No] : In the past 12 months have you used drugs other than those required for medical reasons? No [0] : 2) Feeling down, depressed, or hopeless: Not at all (0) [Never] : Never [No falls in past year] : Patient reported no falls in the past year [PHQ-2 Negative - No further assessment needed] : PHQ-2 Negative - No further assessment needed [With Significant Other] : lives with significant other [Fully functional (bathing, dressing, toileting, transferring, walking, feeding)] : Fully functional (bathing, dressing, toileting, transferring, walking, feeding) [Fully functional (using the telephone, shopping, preparing meals, housekeeping, doing laundry, using] : Fully functional and needs no help or supervision to perform IADLs (using the telephone, shopping, preparing meals, housekeeping, doing laundry, using transportation, managing medications and managing finances) [Smoke Detector] : smoke detector [Carbon Monoxide Detector] : carbon monoxide detector [Seat Belt] :  uses seat belt [Sunscreen] : uses sunscreen [With Patient/Caregiver] : , with patient/caregiver [de-identified] : treadmill and weights [de-identified] : reg [[id number]] : 0 [EyeExamDate] : 1/123 [Change in mental status noted] : No change in mental status noted [ColonoscopyDate] : 01/2012 [AdvancecareDate] : 7/24/23

## 2023-07-24 NOTE — PHYSICAL EXAM
[No Acute Distress] : no acute distress [Well Nourished] : well nourished [Well Developed] : well developed [Well-Appearing] : well-appearing [Normal Sclera/Conjunctiva] : normal sclera/conjunctiva [PERRL] : pupils equal round and reactive to light [EOMI] : extraocular movements intact [Normal Outer Ear/Nose] : the outer ears and nose were normal in appearance [Normal Oropharynx] : the oropharynx was normal [No JVD] : no jugular venous distention [No Lymphadenopathy] : no lymphadenopathy [Supple] : supple [Thyroid Normal, No Nodules] : the thyroid was normal and there were no nodules present [No Respiratory Distress] : no respiratory distress  [No Accessory Muscle Use] : no accessory muscle use [Clear to Auscultation] : lungs were clear to auscultation bilaterally [Normal Rate] : normal rate  [Regular Rhythm] : with a regular rhythm [Normal S1, S2] : normal S1 and S2 [No Murmur] : no murmur heard [No Carotid Bruits] : no carotid bruits [No Abdominal Bruit] : a ~M bruit was not heard ~T in the abdomen [No Varicosities] : no varicosities [Pedal Pulses Present] : the pedal pulses are present [No Edema] : there was no peripheral edema [No Palpable Aorta] : no palpable aorta [No Extremity Clubbing/Cyanosis] : no extremity clubbing/cyanosis [Non Tender] : non-tender [Soft] : abdomen soft [Non-distended] : non-distended [No Masses] : no abdominal mass palpated [No HSM] : no HSM [Normal Bowel Sounds] : normal bowel sounds [Normal Posterior Cervical Nodes] : no posterior cervical lymphadenopathy [Normal Anterior Cervical Nodes] : no anterior cervical lymphadenopathy [No CVA Tenderness] : no CVA  tenderness [No Spinal Tenderness] : no spinal tenderness [No Joint Swelling] : no joint swelling [Grossly Normal Strength/Tone] : grossly normal strength/tone [No Rash] : no rash [Coordination Grossly Intact] : coordination grossly intact [No Focal Deficits] : no focal deficits [Normal Gait] : normal gait [Deep Tendon Reflexes (DTR)] : deep tendon reflexes were 2+ and symmetric [Normal Affect] : the affect was normal [Normal Insight/Judgement] : insight and judgment were intact [No Mass] : no mass [Stool Occult Blood] : stool negative for occult blood [Anus Abnormality] : the anus and perineum were normal [Rectal Exam - Rectum] : digital rectal exam was normal [Prostate Enlargement] : the prostate was not enlarged [Prostate Tenderness] : the prostate was not tender

## 2023-07-25 LAB
25(OH)D3 SERPL-MCNC: 54.4 NG/ML
ALBUMIN SERPL ELPH-MCNC: 4.9 G/DL
ALBUMIN SERPL ELPH-MCNC: 4.9 G/DL
ALDOSTERONE SERUM: 17.3 NG/DL
ALP BLD-CCNC: 54 U/L
ALP BLD-CCNC: 55 U/L
ALT SERPL-CCNC: 15 U/L
ALT SERPL-CCNC: 17 U/L
AMYLASE/CREAT SERPL: 170 U/L
ANION GAP SERPL CALC-SCNC: 13 MMOL/L
ANION GAP SERPL CALC-SCNC: 14 MMOL/L
APPEARANCE: CLEAR
APPEARANCE: CLEAR
AST SERPL-CCNC: 20 U/L
AST SERPL-CCNC: 20 U/L
BACTERIA: NEGATIVE /HPF
BACTERIA: NEGATIVE /HPF
BILIRUB DIRECT SERPL-MCNC: 0.2 MG/DL
BILIRUB SERPL-MCNC: 0.7 MG/DL
BILIRUB SERPL-MCNC: 0.7 MG/DL
BILIRUBIN URINE: NEGATIVE
BILIRUBIN URINE: NEGATIVE
BLOOD URINE: NEGATIVE
BLOOD URINE: NEGATIVE
BUN SERPL-MCNC: 22 MG/DL
BUN SERPL-MCNC: 22 MG/DL
CALCIUM SERPL-MCNC: 10 MG/DL
CALCIUM SERPL-MCNC: 9.9 MG/DL
CAST: 0 /LPF
CAST: 0 /LPF
CHLORIDE SERPL-SCNC: 102 MMOL/L
CHLORIDE SERPL-SCNC: 102 MMOL/L
CHOLEST SERPL-MCNC: 180 MG/DL
CO2 SERPL-SCNC: 24 MMOL/L
CO2 SERPL-SCNC: 25 MMOL/L
COLOR: YELLOW
COLOR: YELLOW
CREAT SERPL-MCNC: 1.8 MG/DL
CREAT SERPL-MCNC: 1.82 MG/DL
CREAT SPEC-SCNC: 99 MG/DL
CREAT SPEC-SCNC: 99 MG/DL
CREAT/PROT UR: 0.1 RATIO
CYSTATIN C SERPL-MCNC: 1.47 MG/L
EGFR: 36 ML/MIN/1.73M2
EGFR: 36 ML/MIN/1.73M2
EPITHELIAL CELLS: 0 /HPF
EPITHELIAL CELLS: 0 /HPF
ESTIMATED AVERAGE GLUCOSE: 114 MG/DL
FERRITIN SERPL-MCNC: 91 NG/ML
GFR/BSA.PRED SERPLBLD CYS-BASED-ARV: 42 ML/MIN/1.73M2
GLUCOSE QUALITATIVE U: NEGATIVE MG/DL
GLUCOSE QUALITATIVE U: NEGATIVE MG/DL
GLUCOSE SERPL-MCNC: 88 MG/DL
GLUCOSE SERPL-MCNC: 88 MG/DL
HBA1C MFR BLD HPLC: 5.6 %
HCV AB SER QL: NONREACTIVE
HCV S/CO RATIO: 0.12 S/CO
HDLC SERPL-MCNC: 65 MG/DL
IRON SERPL-MCNC: 109 UG/DL
KETONES URINE: NEGATIVE MG/DL
KETONES URINE: NEGATIVE MG/DL
LDLC SERPL CALC-MCNC: 97 MG/DL
LEUKOCYTE ESTERASE URINE: NEGATIVE
LEUKOCYTE ESTERASE URINE: NEGATIVE
LPL SERPL-CCNC: 51 U/L
MICROALBUMIN 24H UR DL<=1MG/L-MCNC: 1.4 MG/DL
MICROALBUMIN/CREAT 24H UR-RTO: 14 MG/G
MICROSCOPIC-UA: NORMAL
MICROSCOPIC-UA: NORMAL
NITRITE URINE: NEGATIVE
NITRITE URINE: NEGATIVE
NONHDLC SERPL-MCNC: 115 MG/DL
PH URINE: 7
PH URINE: 7.5
POTASSIUM SERPL-SCNC: 4.3 MMOL/L
POTASSIUM SERPL-SCNC: 4.4 MMOL/L
PROT SERPL-MCNC: 7.3 G/DL
PROT SERPL-MCNC: 7.3 G/DL
PROT UR-MCNC: 9 MG/DL
PROTEIN URINE: NEGATIVE MG/DL
PROTEIN URINE: NEGATIVE MG/DL
PSA SERPL-MCNC: 10.7 NG/ML
RED BLOOD CELLS URINE: 0 /HPF
RED BLOOD CELLS URINE: 0 /HPF
SODIUM ?TM SUB UR QN: 51 MMOL/L
SODIUM SERPL-SCNC: 139 MMOL/L
SODIUM SERPL-SCNC: 141 MMOL/L
SPECIFIC GRAVITY URINE: 1.01
SPECIFIC GRAVITY URINE: 1.01
T4 SERPL-MCNC: 8.6 UG/DL
TRIGL SERPL-MCNC: 98 MG/DL
TSH SERPL-ACNC: 1.15 UIU/ML
UROBILINOGEN URINE: 0.2 MG/DL
UROBILINOGEN URINE: 0.2 MG/DL
WHITE BLOOD CELLS URINE: 0 /HPF
WHITE BLOOD CELLS URINE: 0 /HPF

## 2023-07-26 ENCOUNTER — NON-APPOINTMENT (OUTPATIENT)
Age: 88
End: 2023-07-26

## 2023-08-01 LAB — RENIN ACTIVITY, PLASMA: 0.66 NG/ML/HR

## 2023-08-16 ENCOUNTER — TRANSCRIPTION ENCOUNTER (OUTPATIENT)
Age: 88
End: 2023-08-16

## 2023-09-18 ENCOUNTER — NON-APPOINTMENT (OUTPATIENT)
Age: 88
End: 2023-09-18

## 2023-09-19 ENCOUNTER — APPOINTMENT (OUTPATIENT)
Dept: CARDIOLOGY | Facility: CLINIC | Age: 88
End: 2023-09-19

## 2023-10-20 ENCOUNTER — APPOINTMENT (OUTPATIENT)
Dept: CARDIOLOGY | Facility: CLINIC | Age: 88
End: 2023-10-20
Payer: MEDICARE

## 2023-10-20 ENCOUNTER — NON-APPOINTMENT (OUTPATIENT)
Age: 88
End: 2023-10-20

## 2023-10-20 VITALS
DIASTOLIC BLOOD PRESSURE: 74 MMHG | OXYGEN SATURATION: 99 % | SYSTOLIC BLOOD PRESSURE: 158 MMHG | HEART RATE: 67 BPM | HEIGHT: 66 IN | BODY MASS INDEX: 24.27 KG/M2 | WEIGHT: 151 LBS

## 2023-10-20 DIAGNOSIS — R55 SYNCOPE AND COLLAPSE: ICD-10-CM

## 2023-10-20 PROCEDURE — 93000 ELECTROCARDIOGRAM COMPLETE: CPT

## 2023-10-20 PROCEDURE — 99214 OFFICE O/P EST MOD 30 MIN: CPT

## 2023-11-01 ENCOUNTER — APPOINTMENT (OUTPATIENT)
Dept: CARDIOLOGY | Facility: CLINIC | Age: 88
End: 2023-11-01
Payer: MEDICARE

## 2023-11-01 VITALS
SYSTOLIC BLOOD PRESSURE: 150 MMHG | BODY MASS INDEX: 24.59 KG/M2 | HEART RATE: 66 BPM | WEIGHT: 153 LBS | DIASTOLIC BLOOD PRESSURE: 80 MMHG | HEIGHT: 66 IN | OXYGEN SATURATION: 98 %

## 2023-11-01 VITALS — DIASTOLIC BLOOD PRESSURE: 80 MMHG | SYSTOLIC BLOOD PRESSURE: 150 MMHG

## 2023-11-01 PROCEDURE — 99214 OFFICE O/P EST MOD 30 MIN: CPT | Mod: 25

## 2023-11-01 PROCEDURE — 93306 TTE W/DOPPLER COMPLETE: CPT

## 2023-11-01 RX ORDER — TRAVOPROST (BENZALKONIUM) 0.004 %
DROPS OPHTHALMIC (EYE)
Refills: 0 | Status: DISCONTINUED | COMMUNITY
End: 2023-11-01

## 2023-11-11 ENCOUNTER — INPATIENT (INPATIENT)
Facility: HOSPITAL | Age: 88
LOS: 2 days | Discharge: HOME HEALTH | DRG: 322 | End: 2023-11-14
Attending: SURGERY | Admitting: SURGERY
Payer: MEDICARE

## 2023-11-11 VITALS — HEIGHT: 66 IN | WEIGHT: 147.93 LBS

## 2023-11-11 DIAGNOSIS — I21.3 ST ELEVATION (STEMI) MYOCARDIAL INFARCTION OF UNSPECIFIED SITE: ICD-10-CM

## 2023-11-11 LAB
ADD ON TEST-SPECIMEN IN LAB: SIGNIFICANT CHANGE UP
ALBUMIN SERPL ELPH-MCNC: 3.4 G/DL — SIGNIFICANT CHANGE UP (ref 3.3–5)
ALBUMIN SERPL ELPH-MCNC: 3.4 G/DL — SIGNIFICANT CHANGE UP (ref 3.3–5)
ALP SERPL-CCNC: 52 U/L — SIGNIFICANT CHANGE UP (ref 40–120)
ALP SERPL-CCNC: 52 U/L — SIGNIFICANT CHANGE UP (ref 40–120)
ALT FLD-CCNC: 47 U/L — SIGNIFICANT CHANGE UP (ref 12–78)
ALT FLD-CCNC: 47 U/L — SIGNIFICANT CHANGE UP (ref 12–78)
ANION GAP SERPL CALC-SCNC: 8 MMOL/L — SIGNIFICANT CHANGE UP (ref 5–17)
APTT BLD: 29.2 SEC — SIGNIFICANT CHANGE UP (ref 24.5–35.6)
APTT BLD: 29.2 SEC — SIGNIFICANT CHANGE UP (ref 24.5–35.6)
AST SERPL-CCNC: 208 U/L — HIGH (ref 15–37)
AST SERPL-CCNC: 208 U/L — HIGH (ref 15–37)
BASOPHILS # BLD AUTO: 0.02 K/UL — SIGNIFICANT CHANGE UP (ref 0–0.2)
BASOPHILS # BLD AUTO: 0.02 K/UL — SIGNIFICANT CHANGE UP (ref 0–0.2)
BASOPHILS NFR BLD AUTO: 0.1 % — SIGNIFICANT CHANGE UP (ref 0–2)
BASOPHILS NFR BLD AUTO: 0.1 % — SIGNIFICANT CHANGE UP (ref 0–2)
BILIRUB SERPL-MCNC: 0.8 MG/DL — SIGNIFICANT CHANGE UP (ref 0.2–1.2)
BILIRUB SERPL-MCNC: 0.8 MG/DL — SIGNIFICANT CHANGE UP (ref 0.2–1.2)
BUN SERPL-MCNC: 16 MG/DL — SIGNIFICANT CHANGE UP (ref 7–23)
BUN SERPL-MCNC: 16 MG/DL — SIGNIFICANT CHANGE UP (ref 7–23)
BUN SERPL-MCNC: 19 MG/DL — SIGNIFICANT CHANGE UP (ref 7–23)
BUN SERPL-MCNC: 19 MG/DL — SIGNIFICANT CHANGE UP (ref 7–23)
CALCIUM SERPL-MCNC: 8.6 MG/DL — SIGNIFICANT CHANGE UP (ref 8.5–10.1)
CALCIUM SERPL-MCNC: 8.6 MG/DL — SIGNIFICANT CHANGE UP (ref 8.5–10.1)
CALCIUM SERPL-MCNC: 8.8 MG/DL — SIGNIFICANT CHANGE UP (ref 8.5–10.1)
CALCIUM SERPL-MCNC: 8.8 MG/DL — SIGNIFICANT CHANGE UP (ref 8.5–10.1)
CHLORIDE SERPL-SCNC: 106 MMOL/L — SIGNIFICANT CHANGE UP (ref 96–108)
CHLORIDE SERPL-SCNC: 106 MMOL/L — SIGNIFICANT CHANGE UP (ref 96–108)
CHLORIDE SERPL-SCNC: 108 MMOL/L — SIGNIFICANT CHANGE UP (ref 96–108)
CHLORIDE SERPL-SCNC: 108 MMOL/L — SIGNIFICANT CHANGE UP (ref 96–108)
CO2 SERPL-SCNC: 24 MMOL/L — SIGNIFICANT CHANGE UP (ref 22–31)
CO2 SERPL-SCNC: 24 MMOL/L — SIGNIFICANT CHANGE UP (ref 22–31)
CO2 SERPL-SCNC: 25 MMOL/L — SIGNIFICANT CHANGE UP (ref 22–31)
CO2 SERPL-SCNC: 25 MMOL/L — SIGNIFICANT CHANGE UP (ref 22–31)
CREAT SERPL-MCNC: 1.45 MG/DL — HIGH (ref 0.5–1.3)
CREAT SERPL-MCNC: 1.45 MG/DL — HIGH (ref 0.5–1.3)
CREAT SERPL-MCNC: 1.54 MG/DL — HIGH (ref 0.5–1.3)
CREAT SERPL-MCNC: 1.54 MG/DL — HIGH (ref 0.5–1.3)
EGFR: 44 ML/MIN/1.73M2 — LOW
EGFR: 44 ML/MIN/1.73M2 — LOW
EGFR: 47 ML/MIN/1.73M2 — LOW
EGFR: 47 ML/MIN/1.73M2 — LOW
EOSINOPHIL # BLD AUTO: 0.01 K/UL — SIGNIFICANT CHANGE UP (ref 0–0.5)
EOSINOPHIL # BLD AUTO: 0.01 K/UL — SIGNIFICANT CHANGE UP (ref 0–0.5)
EOSINOPHIL NFR BLD AUTO: 0.1 % — SIGNIFICANT CHANGE UP (ref 0–6)
EOSINOPHIL NFR BLD AUTO: 0.1 % — SIGNIFICANT CHANGE UP (ref 0–6)
GLUCOSE SERPL-MCNC: 123 MG/DL — HIGH (ref 70–99)
GLUCOSE SERPL-MCNC: 123 MG/DL — HIGH (ref 70–99)
GLUCOSE SERPL-MCNC: 138 MG/DL — HIGH (ref 70–99)
GLUCOSE SERPL-MCNC: 138 MG/DL — HIGH (ref 70–99)
HCT VFR BLD CALC: 46.5 % — SIGNIFICANT CHANGE UP (ref 39–50)
HCT VFR BLD CALC: 46.5 % — SIGNIFICANT CHANGE UP (ref 39–50)
HGB BLD-MCNC: 15.6 G/DL — SIGNIFICANT CHANGE UP (ref 13–17)
HGB BLD-MCNC: 15.6 G/DL — SIGNIFICANT CHANGE UP (ref 13–17)
IMM GRANULOCYTES NFR BLD AUTO: 0.4 % — SIGNIFICANT CHANGE UP (ref 0–0.9)
IMM GRANULOCYTES NFR BLD AUTO: 0.4 % — SIGNIFICANT CHANGE UP (ref 0–0.9)
INR BLD: 0.96 RATIO — SIGNIFICANT CHANGE UP (ref 0.85–1.18)
INR BLD: 0.96 RATIO — SIGNIFICANT CHANGE UP (ref 0.85–1.18)
LYMPHOCYTES # BLD AUTO: 0.67 K/UL — LOW (ref 1–3.3)
LYMPHOCYTES # BLD AUTO: 0.67 K/UL — LOW (ref 1–3.3)
LYMPHOCYTES # BLD AUTO: 4.7 % — LOW (ref 13–44)
LYMPHOCYTES # BLD AUTO: 4.7 % — LOW (ref 13–44)
MAGNESIUM SERPL-MCNC: 1.9 MG/DL — SIGNIFICANT CHANGE UP (ref 1.6–2.6)
MAGNESIUM SERPL-MCNC: 1.9 MG/DL — SIGNIFICANT CHANGE UP (ref 1.6–2.6)
MANUAL SMEAR VERIFICATION: SIGNIFICANT CHANGE UP
MANUAL SMEAR VERIFICATION: SIGNIFICANT CHANGE UP
MCHC RBC-ENTMCNC: 30.6 PG — SIGNIFICANT CHANGE UP (ref 27–34)
MCHC RBC-ENTMCNC: 30.6 PG — SIGNIFICANT CHANGE UP (ref 27–34)
MCHC RBC-ENTMCNC: 33.5 GM/DL — SIGNIFICANT CHANGE UP (ref 32–36)
MCHC RBC-ENTMCNC: 33.5 GM/DL — SIGNIFICANT CHANGE UP (ref 32–36)
MCV RBC AUTO: 91.2 FL — SIGNIFICANT CHANGE UP (ref 80–100)
MCV RBC AUTO: 91.2 FL — SIGNIFICANT CHANGE UP (ref 80–100)
MONOCYTES # BLD AUTO: 1.57 K/UL — HIGH (ref 0–0.9)
MONOCYTES # BLD AUTO: 1.57 K/UL — HIGH (ref 0–0.9)
MONOCYTES NFR BLD AUTO: 11 % — SIGNIFICANT CHANGE UP (ref 2–14)
MONOCYTES NFR BLD AUTO: 11 % — SIGNIFICANT CHANGE UP (ref 2–14)
NEUTROPHILS # BLD AUTO: 11.92 K/UL — HIGH (ref 1.8–7.4)
NEUTROPHILS # BLD AUTO: 11.92 K/UL — HIGH (ref 1.8–7.4)
NEUTROPHILS NFR BLD AUTO: 83.7 % — HIGH (ref 43–77)
NEUTROPHILS NFR BLD AUTO: 83.7 % — HIGH (ref 43–77)
PHOSPHATE SERPL-MCNC: 2.1 MG/DL — LOW (ref 2.5–4.5)
PHOSPHATE SERPL-MCNC: 2.1 MG/DL — LOW (ref 2.5–4.5)
PLAT MORPH BLD: NORMAL — SIGNIFICANT CHANGE UP
PLAT MORPH BLD: NORMAL — SIGNIFICANT CHANGE UP
PLATELET # BLD AUTO: 249 K/UL — SIGNIFICANT CHANGE UP (ref 150–400)
PLATELET # BLD AUTO: 249 K/UL — SIGNIFICANT CHANGE UP (ref 150–400)
PLATELET COUNT - ESTIMATE: NORMAL — SIGNIFICANT CHANGE UP
PLATELET COUNT - ESTIMATE: NORMAL — SIGNIFICANT CHANGE UP
POTASSIUM SERPL-MCNC: 3.8 MMOL/L — SIGNIFICANT CHANGE UP (ref 3.5–5.3)
POTASSIUM SERPL-MCNC: 3.8 MMOL/L — SIGNIFICANT CHANGE UP (ref 3.5–5.3)
POTASSIUM SERPL-MCNC: 3.9 MMOL/L — SIGNIFICANT CHANGE UP (ref 3.5–5.3)
POTASSIUM SERPL-MCNC: 3.9 MMOL/L — SIGNIFICANT CHANGE UP (ref 3.5–5.3)
POTASSIUM SERPL-SCNC: 3.8 MMOL/L — SIGNIFICANT CHANGE UP (ref 3.5–5.3)
POTASSIUM SERPL-SCNC: 3.8 MMOL/L — SIGNIFICANT CHANGE UP (ref 3.5–5.3)
POTASSIUM SERPL-SCNC: 3.9 MMOL/L — SIGNIFICANT CHANGE UP (ref 3.5–5.3)
POTASSIUM SERPL-SCNC: 3.9 MMOL/L — SIGNIFICANT CHANGE UP (ref 3.5–5.3)
PROT SERPL-MCNC: 6.8 GM/DL — SIGNIFICANT CHANGE UP (ref 6–8.3)
PROT SERPL-MCNC: 6.8 GM/DL — SIGNIFICANT CHANGE UP (ref 6–8.3)
PROTHROM AB SERPL-ACNC: 10.9 SEC — SIGNIFICANT CHANGE UP (ref 9.5–13)
PROTHROM AB SERPL-ACNC: 10.9 SEC — SIGNIFICANT CHANGE UP (ref 9.5–13)
RBC # BLD: 5.1 M/UL — SIGNIFICANT CHANGE UP (ref 4.2–5.8)
RBC # BLD: 5.1 M/UL — SIGNIFICANT CHANGE UP (ref 4.2–5.8)
RBC # FLD: 13.5 % — SIGNIFICANT CHANGE UP (ref 10.3–14.5)
RBC # FLD: 13.5 % — SIGNIFICANT CHANGE UP (ref 10.3–14.5)
RBC BLD AUTO: NORMAL — SIGNIFICANT CHANGE UP
RBC BLD AUTO: NORMAL — SIGNIFICANT CHANGE UP
SODIUM SERPL-SCNC: 139 MMOL/L — SIGNIFICANT CHANGE UP (ref 135–145)
SODIUM SERPL-SCNC: 139 MMOL/L — SIGNIFICANT CHANGE UP (ref 135–145)
SODIUM SERPL-SCNC: 140 MMOL/L — SIGNIFICANT CHANGE UP (ref 135–145)
SODIUM SERPL-SCNC: 140 MMOL/L — SIGNIFICANT CHANGE UP (ref 135–145)
TROPONIN I, HIGH SENSITIVITY RESULT: HIGH NG/L
WBC # BLD: 14.25 K/UL — HIGH (ref 3.8–10.5)
WBC # BLD: 14.25 K/UL — HIGH (ref 3.8–10.5)
WBC # FLD AUTO: 14.25 K/UL — HIGH (ref 3.8–10.5)
WBC # FLD AUTO: 14.25 K/UL — HIGH (ref 3.8–10.5)

## 2023-11-11 PROCEDURE — 36415 COLL VENOUS BLD VENIPUNCTURE: CPT

## 2023-11-11 PROCEDURE — 93010 ELECTROCARDIOGRAM REPORT: CPT | Mod: 76

## 2023-11-11 PROCEDURE — C1874: CPT

## 2023-11-11 PROCEDURE — 83036 HEMOGLOBIN GLYCOSYLATED A1C: CPT

## 2023-11-11 PROCEDURE — 93454 CORONARY ARTERY ANGIO S&I: CPT | Mod: 59

## 2023-11-11 PROCEDURE — 85027 COMPLETE CBC AUTOMATED: CPT

## 2023-11-11 PROCEDURE — 99291 CRITICAL CARE FIRST HOUR: CPT

## 2023-11-11 PROCEDURE — 83735 ASSAY OF MAGNESIUM: CPT

## 2023-11-11 PROCEDURE — C1769: CPT

## 2023-11-11 PROCEDURE — 84100 ASSAY OF PHOSPHORUS: CPT

## 2023-11-11 PROCEDURE — C1725: CPT

## 2023-11-11 PROCEDURE — C1887: CPT

## 2023-11-11 PROCEDURE — C9600: CPT | Mod: RC

## 2023-11-11 PROCEDURE — 80048 BASIC METABOLIC PNL TOTAL CA: CPT

## 2023-11-11 PROCEDURE — 93306 TTE W/DOPPLER COMPLETE: CPT | Mod: 26

## 2023-11-11 PROCEDURE — 99223 1ST HOSP IP/OBS HIGH 75: CPT

## 2023-11-11 PROCEDURE — 93005 ELECTROCARDIOGRAM TRACING: CPT

## 2023-11-11 PROCEDURE — C1894: CPT

## 2023-11-11 PROCEDURE — 93306 TTE W/DOPPLER COMPLETE: CPT

## 2023-11-11 PROCEDURE — 80061 LIPID PANEL: CPT

## 2023-11-11 PROCEDURE — 84484 ASSAY OF TROPONIN QUANT: CPT

## 2023-11-11 PROCEDURE — 84443 ASSAY THYROID STIM HORMONE: CPT

## 2023-11-11 PROCEDURE — 71045 X-RAY EXAM CHEST 1 VIEW: CPT | Mod: 26

## 2023-11-11 RX ORDER — MORPHINE SULFATE 50 MG/1
2 CAPSULE, EXTENDED RELEASE ORAL
Refills: 0 | Status: DISCONTINUED | OUTPATIENT
Start: 2023-11-11 | End: 2023-11-14

## 2023-11-11 RX ORDER — HEPARIN SODIUM 5000 [USP'U]/ML
INJECTION INTRAVENOUS; SUBCUTANEOUS
Qty: 25000 | Refills: 0 | Status: DISCONTINUED | OUTPATIENT
Start: 2023-11-11 | End: 2023-11-11

## 2023-11-11 RX ORDER — DORZOLAMIDE HYDROCHLORIDE TIMOLOL MALEATE 20; 5 MG/ML; MG/ML
1 SOLUTION/ DROPS OPHTHALMIC
Refills: 0 | Status: DISCONTINUED | OUTPATIENT
Start: 2023-11-11 | End: 2023-11-14

## 2023-11-11 RX ORDER — ASPIRIN/CALCIUM CARB/MAGNESIUM 324 MG
81 TABLET ORAL DAILY
Refills: 0 | Status: DISCONTINUED | OUTPATIENT
Start: 2023-11-11 | End: 2023-11-14

## 2023-11-11 RX ORDER — LATANOPROST 0.05 MG/ML
1 SOLUTION/ DROPS OPHTHALMIC; TOPICAL AT BEDTIME
Refills: 0 | Status: DISCONTINUED | OUTPATIENT
Start: 2023-11-11 | End: 2023-11-14

## 2023-11-11 RX ORDER — ACETAMINOPHEN 500 MG
1000 TABLET ORAL ONCE
Refills: 0 | Status: COMPLETED | OUTPATIENT
Start: 2023-11-11 | End: 2023-11-11

## 2023-11-11 RX ORDER — ATORVASTATIN CALCIUM 80 MG/1
80 TABLET, FILM COATED ORAL AT BEDTIME
Refills: 0 | Status: DISCONTINUED | OUTPATIENT
Start: 2023-11-11 | End: 2023-11-14

## 2023-11-11 RX ORDER — CLOPIDOGREL BISULFATE 75 MG/1
75 TABLET, FILM COATED ORAL DAILY
Refills: 0 | Status: DISCONTINUED | OUTPATIENT
Start: 2023-11-12 | End: 2023-11-14

## 2023-11-11 RX ORDER — FOLIC ACID 0.8 MG
1 TABLET ORAL
Refills: 0 | DISCHARGE

## 2023-11-11 RX ORDER — AMLODIPINE BESYLATE 2.5 MG/1
10 TABLET ORAL DAILY
Refills: 0 | Status: DISCONTINUED | OUTPATIENT
Start: 2023-11-11 | End: 2023-11-14

## 2023-11-11 RX ORDER — BRIMONIDINE TARTRATE, TIMOLOL MALEATE 2; 5 MG/ML; MG/ML
1 SOLUTION/ DROPS OPHTHALMIC EVERY 12 HOURS
Refills: 0 | Status: DISCONTINUED | OUTPATIENT
Start: 2023-11-11 | End: 2023-11-14

## 2023-11-11 RX ORDER — ATORVASTATIN CALCIUM 80 MG/1
80 TABLET, FILM COATED ORAL AT BEDTIME
Refills: 0 | Status: DISCONTINUED | OUTPATIENT
Start: 2023-11-11 | End: 2023-11-11

## 2023-11-11 RX ORDER — ROSUVASTATIN CALCIUM 5 MG/1
1 TABLET ORAL
Refills: 0 | DISCHARGE

## 2023-11-11 RX ORDER — ACETAMINOPHEN 500 MG
650 TABLET ORAL EVERY 6 HOURS
Refills: 0 | Status: DISCONTINUED | OUTPATIENT
Start: 2023-11-11 | End: 2023-11-14

## 2023-11-11 RX ORDER — ASPIRIN/CALCIUM CARB/MAGNESIUM 324 MG
324 TABLET ORAL ONCE
Refills: 0 | Status: COMPLETED | OUTPATIENT
Start: 2023-11-11 | End: 2023-11-11

## 2023-11-11 RX ORDER — MAGNESIUM SULFATE 500 MG/ML
2 VIAL (ML) INJECTION ONCE
Refills: 0 | Status: COMPLETED | OUTPATIENT
Start: 2023-11-11 | End: 2023-11-12

## 2023-11-11 RX ORDER — HEPARIN SODIUM 5000 [USP'U]/ML
4100 INJECTION INTRAVENOUS; SUBCUTANEOUS EVERY 6 HOURS
Refills: 0 | Status: DISCONTINUED | OUTPATIENT
Start: 2023-11-11 | End: 2023-11-11

## 2023-11-11 RX ORDER — HEPARIN SODIUM 5000 [USP'U]/ML
4100 INJECTION INTRAVENOUS; SUBCUTANEOUS ONCE
Refills: 0 | Status: COMPLETED | OUTPATIENT
Start: 2023-11-11 | End: 2023-11-11

## 2023-11-11 RX ORDER — METOPROLOL TARTRATE 50 MG
25 TABLET ORAL
Refills: 0 | Status: DISCONTINUED | OUTPATIENT
Start: 2023-11-11 | End: 2023-11-14

## 2023-11-11 RX ORDER — POTASSIUM CHLORIDE 20 MEQ
40 PACKET (EA) ORAL ONCE
Refills: 0 | Status: DISCONTINUED | OUTPATIENT
Start: 2023-11-11 | End: 2023-11-12

## 2023-11-11 RX ORDER — SODIUM CHLORIDE 9 MG/ML
500 INJECTION INTRAMUSCULAR; INTRAVENOUS; SUBCUTANEOUS ONCE
Refills: 0 | Status: COMPLETED | OUTPATIENT
Start: 2023-11-11 | End: 2023-11-11

## 2023-11-11 RX ORDER — CLOPIDOGREL BISULFATE 75 MG/1
300 TABLET, FILM COATED ORAL ONCE
Refills: 0 | Status: COMPLETED | OUTPATIENT
Start: 2023-11-11 | End: 2023-11-11

## 2023-11-11 RX ORDER — ONDANSETRON 8 MG/1
4 TABLET, FILM COATED ORAL ONCE
Refills: 0 | Status: COMPLETED | OUTPATIENT
Start: 2023-11-11 | End: 2023-11-11

## 2023-11-11 RX ADMIN — SODIUM CHLORIDE 500 MILLILITER(S): 9 INJECTION INTRAMUSCULAR; INTRAVENOUS; SUBCUTANEOUS at 12:44

## 2023-11-11 RX ADMIN — Medication 400 MILLIGRAM(S): at 21:15

## 2023-11-11 RX ADMIN — Medication 25 MILLIGRAM(S): at 21:09

## 2023-11-11 RX ADMIN — ONDANSETRON 4 MILLIGRAM(S): 8 TABLET, FILM COATED ORAL at 12:43

## 2023-11-11 RX ADMIN — Medication 324 MILLIGRAM(S): at 13:21

## 2023-11-11 RX ADMIN — CLOPIDOGREL BISULFATE 300 MILLIGRAM(S): 75 TABLET, FILM COATED ORAL at 13:20

## 2023-11-11 RX ADMIN — ATORVASTATIN CALCIUM 80 MILLIGRAM(S): 80 TABLET, FILM COATED ORAL at 21:09

## 2023-11-11 RX ADMIN — HEPARIN SODIUM 800 UNIT(S)/HR: 5000 INJECTION INTRAVENOUS; SUBCUTANEOUS at 13:49

## 2023-11-11 RX ADMIN — HEPARIN SODIUM 4100 UNIT(S): 5000 INJECTION INTRAVENOUS; SUBCUTANEOUS at 13:52

## 2023-11-11 NOTE — ED ADULT NURSE NOTE - NSFALLHARMRISKINTERV_ED_ALL_ED
Assistance OOB with selected safe patient handling equipment if applicable/Communicate risk of Fall with Harm to all staff, patient, and family/Provide visual cue: red socks, yellow wristband, yellow gown, etc/Reinforce activity limits and safety measures with patient and family/Bed in lowest position, wheels locked, appropriate side rails in place/Call bell, personal items and telephone in reach/Instruct patient to call for assistance before getting out of bed/chair/stretcher/Non-slip footwear applied when patient is off stretcher/[place] to call system/Physically safe environment - no spills, clutter or unnecessary equipment/Purposeful Proactive Rounding/Room/bathroom lighting operational, light cord in reach

## 2023-11-11 NOTE — H&P ADULT - NSHPLABSRESULTS_GEN_ALL_CORE
CBC Full  -  ( 11 Nov 2023 11:47 )  WBC Count : 14.25 K/uL  RBC Count : 5.10 M/uL  Hemoglobin : 15.6 g/dL  Hematocrit : 46.5 %  Platelet Count - Automated : 249 K/uL  Mean Cell Volume : 91.2 fl  Mean Cell Hemoglobin : 30.6 pg  Mean Cell Hemoglobin Concentration : 33.5 gm/dL  Auto Neutrophil # : 11.92 K/uL  Auto Lymphocyte # : 0.67 K/uL  Auto Monocyte # : 1.57 K/uL  Auto Eosinophil # : 0.01 K/uL  Auto Basophil # : 0.02 K/uL  Auto Neutrophil % : 83.7 %  Auto Lymphocyte % : 4.7 %  Auto Monocyte % : 11.0 %  Auto Eosinophil % : 0.1 %  Auto Basophil % : 0.1 %      11-11    139  |  106  |  16  ----------------------------<  123<H>  3.9   |  25  |  1.54<H>    Ca    8.8      11 Nov 2023 11:47    TPro  6.8  /  Alb  3.4  /  TBili  0.8  /  DBili  x   /  AST  208<H>  /  ALT  47  /  AlkPhos  52  11-11

## 2023-11-11 NOTE — ED ADULT TRIAGE NOTE - CHIEF COMPLAINT QUOTE
Pt arrives to ED complaining of non traumatic left shoulder pain with nausea. Pt states "I want to make sure my heart is ok." Denies chest pain. Hx htn.

## 2023-11-11 NOTE — H&P ADULT - HISTORY OF PRESENT ILLNESS
This patient is an 88 year old male Hx. of HTN, Hypercholesterolemia  who in October had episode of weakness, near syncope.  He subsequently had monitor placed and had normal echo last week.  Yesterday he had some nausea, and belching all night  This am he developed some chest pain and left shoulder pain.  EKg REGINALD 2,3AVF,v4-6 and q waves.  Patient still with some residual left sided chest pain.

## 2023-11-11 NOTE — PROGRESS NOTE ADULT - SUBJECTIVE AND OBJECTIVE BOX
CC: stemi    BRIEF HOSPITAL COURSE: 88M with pmhx hld, recent near syncopal eventd, brought to the ED for dyspepsia and chest/L shoulder pain. ECG revealing of REGINALD in II, III, AVF, V4-6 and Q qaves. Trops >35K. He was admitted for treatment of acute STEMI. Cardiology saw pt in consult. He is now s/p PCI with LUDMILA x1 to RCA.     Events last 24 hours: Underwent cardiac cath earlier today, s/p PCI x1. Now noted to have new onset afib with RVR, rates mostly 110s-120s. Residual midsternal chest discomfort, noted with inspiration. Otherwise without aucte complaints. No SOB, abd pain, n/v/d/dizziness/lightheadedness.     ROS:   CONSTITUTIONAL: (-) fever, (-) chills  RESPIRATORY: (-) SOB, (-) cough  CV: (+) chest pain, (-) palpitations  GI: (-) abdominal pain, (-) nausea, (-) vomiting, (-) diarrhea, (-) constipation   NEURO: (-) headache, (-) weakness, (-) visual changes    PAST MEDICAL & SURGICAL HISTORY:  Stage 2 chronic kidney disease      Hypercholesteremia      Mild HTN          SOCIAL HISTORY:  EtOH:   Smoking:   Recreational drug use:     Medications:    amLODIPine   Tablet 10 milliGRAM(s) Oral daily  metoprolol tartrate 25 milliGRAM(s) Oral two times a day      acetaminophen     Tablet .. 650 milliGRAM(s) Oral every 6 hours PRN  morphine  - Injectable 2 milliGRAM(s) IV Push every 3 hours PRN      aspirin  chewable 81 milliGRAM(s) Oral daily        atorvastatin 80 milliGRAM(s) Oral at bedtime    magnesium sulfate  IVPB 2 Gram(s) IV Intermittent once  potassium chloride    Tablet ER 40 milliEquivalent(s) Oral once      brimonidine 0.2%/ timolol 0.5% Ophthalmic Solution 1 Drop(s) Both EYES every 12 hours  dorzolamide 2%/timolol 0.5% Ophthalmic Solution 1 Drop(s) Both EYES two times a day  latanoprost 0.005% Ophthalmic Solution 1 Drop(s) Both EYES at bedtime            ICU Vital Signs Last 24 Hrs  T(C): 37.7 (11 Nov 2023 16:32), Max: 37.7 (11 Nov 2023 16:32)  T(F): 99.9 (11 Nov 2023 16:32), Max: 99.9 (11 Nov 2023 16:32)  HR: 85 (11 Nov 2023 23:00) (66 - 122)  BP: 82/62 (11 Nov 2023 23:00) (82/62 - 145/81)  BP(mean): 70 (11 Nov 2023 23:00) (70 - 106)  ABP: --  ABP(mean): --  RR: 19 (11 Nov 2023 23:00) (10 - 29)  SpO2: 93% (11 Nov 2023 23:00) (92% - 99%)    O2 Parameters below as of 11 Nov 2023 11:09  Patient On (Oxygen Delivery Method): room air            Physical Examination:    General: Laying in bed comfortably in no acute distress  HEENT: Pupils equal, reactive to light.  Symmetric.  PULM: Clear to auscultation bilaterally, unlabored respirations on room air   CVS: Regular rate and rhythm, no murmurs, rubs, or gallops  ABD: Soft, nondistended, nontender, normoactive bowel sounds, no masses  EXT: No edema, nontender  SKIN: Warm and well perfused  NEURO: interactive, nonfocal         I&O's Detail      LABS:                        15.6   14.25 )-----------( 249      ( 11 Nov 2023 11:47 )             46.5     11-11    140  |  108  |  19  ----------------------------<  138<H>  3.8   |  24  |  1.45<H>    Ca    8.6      11 Nov 2023 18:42  Phos  2.1     11-11  Mg     1.9     11-11    TPro  6.8  /  Alb  3.4  /  TBili  0.8  /  DBili  x   /  AST  208<H>  /  ALT  47  /  AlkPhos  52  11-11          CAPILLARY BLOOD GLUCOSE        PT/INR - ( 11 Nov 2023 11:47 )   PT: 10.9 sec;   INR: 0.96 ratio         PTT - ( 11 Nov 2023 11:47 )  PTT:29.2 sec  Urinalysis Basic - ( 11 Nov 2023 18:42 )    Color: x / Appearance: x / SG: x / pH: x  Gluc: 138 mg/dL / Ketone: x  / Bili: x / Urobili: x   Blood: x / Protein: x / Nitrite: x   Leuk Esterase: x / RBC: x / WBC x   Sq Epi: x / Non Sq Epi: x / Bacteria: x      CULTURES:        RADIOLOGY: ***      INVASIVE LINES:  INDWELLING GALVAN:  VTE PROPHYLAXIS:  CODE STATUS:    CRITICAL CARE TIME SPENT: *** minutes spent performing frequent bedside reassessments and augmenting plan of care to address problems of acute critical illness that pose high probability of life threatening deterioration and/or end organ damage/dysfunction and discussing goals of care, non-inclusive of time spent on procedures performed. Date of entry of this note is equal to the date of services rendered.    Admitting 40+/55+/75+  Follow up 25+/35+/50+    Time spent on this patient encounter, which includes documenting this note in the electronic medical record, was * minutes including assessing the presenting problems with associated risks, reviewing the medical record to prepare for the encounter, and meeting face to face with the patient to obtain additional history. I have also performed an appropriate physical exam, made interventions listed and ordered and interpreted appropriate diagnostic studies as documented. To improve communication and patient safety, I have coordinated care with the multidisciplinary team including the bedside nurse, appropriate attending of record and consultants as needed. Date of entry of this note is equal to the date of services rendered. CC: stemi    BRIEF HOSPITAL COURSE: 88M with pmhx hld, CKD2, recent near syncopal event, brought to the ED for dyspepsia and chest/L shoulder pain. ECG revealing of REGINALD in II, III, AVF, V4-6 and Q qaves. Trops >35K. He was admitted for treatment of acute STEMI. Cardiology saw pt in consult. He is now s/p PCI with LUDMILA x1 to RCA.     Events last 24 hours: Underwent cardiac cath earlier today, s/p PCI x1. Now noted to have new onset afib with RVR, rates mostly 110s-120s. SBPS 120s-130s. Residual midsternal chest discomfort, noted with inspiration. Otherwise without acute complaints. No SOB, abd pain, n/v/d/dizziness/lightheadedness.     ROS:   CONSTITUTIONAL: (-) fever, (-) chills  RESPIRATORY: (-) SOB, (-) cough  CV: (+) chest pain, (-) palpitations  GI: (-) abdominal pain, (-) nausea, (-) vomiting, (-) diarrhea, (-) constipation   NEURO: (-) headache, (-) weakness, (-) visual changes    PAST MEDICAL & SURGICAL HISTORY:  Stage 2 chronic kidney disease  Hypercholesteremia  Mild HTN    Medications:    amLODIPine   Tablet 10 milliGRAM(s) Oral daily  metoprolol tartrate 25 milliGRAM(s) Oral two times a day    acetaminophen     Tablet .. 650 milliGRAM(s) Oral every 6 hours PRN  morphine  - Injectable 2 milliGRAM(s) IV Push every 3 hours PRN    aspirin  chewable 81 milliGRAM(s) Oral daily    atorvastatin 80 milliGRAM(s) Oral at bedtime    magnesium sulfate  IVPB 2 Gram(s) IV Intermittent once  potassium chloride    Tablet ER 40 milliEquivalent(s) Oral once    brimonidine 0.2%/ timolol 0.5% Ophthalmic Solution 1 Drop(s) Both EYES every 12 hours  dorzolamide 2%/timolol 0.5% Ophthalmic Solution 1 Drop(s) Both EYES two times a day  latanoprost 0.005% Ophthalmic Solution 1 Drop(s) Both EYES at bedtime      ICU Vital Signs Last 24 Hrs  T(C): 37.7 (11 Nov 2023 16:32), Max: 37.7 (11 Nov 2023 16:32)  T(F): 99.9 (11 Nov 2023 16:32), Max: 99.9 (11 Nov 2023 16:32)  HR: 85 (11 Nov 2023 23:00) (66 - 122)  BP: 82/62 (11 Nov 2023 23:00) (82/62 - 145/81)  BP(mean): 70 (11 Nov 2023 23:00) (70 - 106)  ABP: --  ABP(mean): --  RR: 19 (11 Nov 2023 23:00) (10 - 29)  SpO2: 93% (11 Nov 2023 23:00) (92% - 99%)    O2 Parameters below as of 11 Nov 2023 11:09  Patient On (Oxygen Delivery Method): room air      Physical Examination:    General: Laying in bed comfortably in no acute distress  PULM: Clear to auscultation bilaterally, unlabored respirations on room air   CVS: Regular rate and rhythm, +s1s2  ABD: Soft, nondistended, nontender, normoactive bowel sounds. R groin soft with dressing in place c/d/i  EXT: No edema, nontender  SKIN: Warm and well perfused  NEURO: interactive, nonfocal       I&O's Detail      LABS:                        15.6   14.25 )-----------( 249      ( 11 Nov 2023 11:47 )             46.5     11-11    140  |  108  |  19  ----------------------------<  138<H>  3.8   |  24  |  1.45<H>    Ca    8.6      11 Nov 2023 18:42  Phos  2.1     11-11  Mg     1.9     11-11    TPro  6.8  /  Alb  3.4  /  TBili  0.8  /  DBili  x   /  AST  208<H>  /  ALT  47  /  AlkPhos  52  11-11          CAPILLARY BLOOD GLUCOSE        PT/INR - ( 11 Nov 2023 11:47 )   PT: 10.9 sec;   INR: 0.96 ratio         PTT - ( 11 Nov 2023 11:47 )  PTT:29.2 sec  Urinalysis Basic - ( 11 Nov 2023 18:42 )    Color: x / Appearance: x / SG: x / pH: x  Gluc: 138 mg/dL / Ketone: x  / Bili: x / Urobili: x   Blood: x / Protein: x / Nitrite: x   Leuk Esterase: x / RBC: x / WBC x   Sq Epi: x / Non Sq Epi: x / Bacteria: x      INVASIVE LINES: N   INDWELLING GALVAN: N   VTE PROPHYLAXIS: dapt  CODE STATUS: full     Time spent on this patient encounter, which includes documenting this note in the electronic medical record, was 76 minutes including assessing the presenting problems with associated risks, reviewing the medical record to prepare for the encounter, and meeting face to face with the patient to obtain additional history. I have also performed an appropriate physical exam, made interventions listed and ordered and interpreted appropriate diagnostic studies as documented. To improve communication and patient safety, I have coordinated care with the multidisciplinary team including the bedside nurse, appropriate attending of record and consultants as needed. Date of entry of this note is equal to the date of services rendered.

## 2023-11-11 NOTE — ED ADULT NURSE NOTE - NSSUHOSCREENINGYN_ED_ALL_ED
Called pt. Verified concerns. Pt wanted to know how her medication will be refill with Dr Chanel being on [facility]. Informed her that she can still route it to her PCP and the covering providers will review. She inquired about Dr Chanel's NICCI. Updated her with the latest. She verbally understood and stated she wanted to send him a letter. Informed her that she can send it to the office to be sent to him. Confirmed her upcoming OV. She verbally understood again.    Yes - the patient is able to be screened

## 2023-11-11 NOTE — ED PROVIDER NOTE - OBJECTIVE STATEMENT
88-year-old male presents the emergency department for nausea left shoulder pain and burping since last night no chest pain no shortness of breath no abdominal pain no fevers.  Patient follows with Dr. Guadalupe  states he had an echo 1 week ago which was normal has been on a Holter monitor.  On arrival EKG shows ST elevations V4 5 6 with diffuse Q waves patient also with some elevation in 2 3 aVF Case discussed with PCI doc states with Q waves no PCI at this time page placed to primary cardiologist will work-up for ACS

## 2023-11-11 NOTE — CONSULT NOTE ADULT - PROBLEM SELECTOR RECOMMENDATION 9
Pt has acute chest and left shoulder pain x1 day.   ECG with inferolateral REGINALD and q waves. Hs trop 35,000.   Persistent mild discomfort in ED which has since resolved.   He has no hx CAD. Recent evaluation for presyncope 11/1 with normal echocardiogram. MCOT in progress  Start heparin gtt, aspirin 81mg (after load), plavix load with 300mg daily  Will start atorvastatin 80mg   Will obtain repeat echo; obtain lipid panel and hgb A1c   If hemodynamically stable, would start metoprolol tartrate 12.5mg BID    Discussed his case, with interventionalist Dr. Sinha (was initially consulted by ED); does not feel pt needs cath emergently Pt has acute chest and left shoulder pain x1 day.   ECG with inferolateral REGINALD and q waves. Hs trop 35,000.   Persistent mild discomfort in ED which has since resolved.   He has no hx CAD. Recent evaluation for presyncope 11/1 with normal echocardiogram. MCOT in progress  Start heparin gtt, aspirin 81mg (after load), plavix load with 300mg daily  Will start atorvastatin 80mg   Will obtain repeat echo; obtain lipid panel and hgb A1c   If hemodynamically stable, would start metoprolol tartrate 12.5mg BID    Discussed his case, with interventionalist Dr. Sinha (was initially consulted by ED); does not feel pt needs cath emergently. If pain recurs, recommend nitro if hemodynamically stable and contact interventional for emergent case.

## 2023-11-11 NOTE — H&P ADULT - ASSESSMENT
Patient admitted with chest pain, r/o stemi  with ekg changes but already q waves, and troponin 35 thousand  risk factors, hypercholesterolemia, HTN    Plan  1. aspirin  2. Plavix  3. Heparin  4. will start low dose bblocker  5. echo was normal a week ago  6. timimg of cath per interventional radiology who turned it down at this time

## 2023-11-11 NOTE — H&P ADULT - NSHPREVIEWOFSYSTEMS_GEN_ALL_CORE
belching, nausea,  hx. htn, inc cholesterol  had syncopal episode last onth  no fever, no chills,   no diabetes  no abdominal pain, no extremities pain.

## 2023-11-11 NOTE — ED PROVIDER NOTE - CLINICAL SUMMARY MEDICAL DECISION MAKING FREE TEXT BOX
88-year-old male presents the emergency department for nausea left shoulder pain and burping since last night no chest pain no shortness of breath no abdominal pain no fevers.  Patient follows with Dr. Guadalupe  states he had an echo 1 week ago which was normal has been on a Holter monitor.  On arrival EKG shows ST elevations V4 5 6 with diffuse Q waves patient also with some elevation in 2 3 aVF Case discussed with PCI doc states with Q waves no PCI at this time page placed to primary cardiologist will work-up for ACS 88-year-old male presents the emergency department for nausea left shoulder pain and burping since last night no chest pain no shortness of breath no abdominal pain no fevers.  Patient follows with Dr. Guadalupe  states he had an echo 1 week ago which was normal has been on a Holter monitor.  On arrival EKG shows ST elevations V4 5 6 with diffuse Q waves patient also with some elevation in 2 3 aVF Case discussed with PCI doc states with Q waves no PCI at this time page placed to primary cardiologist will work-up for ACS  Troponin came back at 35,000.  Spoke with patient's cardiologist they will see patient in the ED seen by cardiologist in the ED patient to go to CCU CCU accepted.

## 2023-11-11 NOTE — PROGRESS NOTE ADULT - ASSESSMENT
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 unclear if afib triggered by acute events vs paroxysmal, etc. given recent initiation of dapt, will defer starting heparin gtt/triple therapy due to increased risk of bleeding - d/w Dr. Sinha  - CHADSVASC2 = 4 (age, htn, now with MI). will need further risk vs. benefit discussion with cardiology, following    - had been started on metoprolol 25mg po BID, will give additional pushes of lopressor PRN  - trop uptrending; anticipated post-cath - trend to peak    - check tsh   - goal K >4 mg >2 phos >3; give kphos and mg now   - TTE 11/11 with LVEF 50%, WMA, mildly reduced systolic/diastolic disfunction, AS    - ofirmev 1g ivpb x1     Dispo: remain in ICU     pt, his wife and daughter updated at bedside this evening at length. all questions answered             88M with pmhx HLD, CKD2 now admitted with:     acute inferior wall MI s/p PCI x1 RCA  new onset afib with rvr  chest pain  hypophosphatemia   hypokalemia  hypoMg    - dapt with asa/plavix   - started on high intensity statin with atorvastatin 80mg po qhs   - stat ecg performed given arrhythmia on tele and midstenal chest pain - confirming new onset afib with rvr, persistent REGINALD, RBBB. unclear if afib triggered by acute events vs paroxysmal, etc. given recent initiation of dapt, will defer starting heparin gtt/triple therapy due to increased risk of bleeding - d/w Dr. Sinha  - CHADSVASC2 = 4 (age, htn, now with MI). will need further risk vs. benefit discussion with cardiology, following    - had been started on metoprolol 25mg po BID, will give additional pushes of lopressor PRN  - trop uptrending; anticipated post-cath - trend to peak    - check tsh   - goal K >4 mg >2 phos >3; give kphos and mg now   - TTE 11/11 with LVEF 50%, WMA, mildly reduced systolic/diastolic disfunction, AS    - ofirmev 1g ivpb x1     Dispo: remain in ICU     pt, his wife and daughter updated at bedside this evening at length. all questions answered

## 2023-11-11 NOTE — H&P ADULT - NSHPPHYSICALEXAM_GEN_ALL_CORE
General - no distress  HEENT nc/at  neck supple, no jvd  lungs clear  cv rrr, no murmur  abdomen soft, non tender  extremities warm  gu voiding  neuro awake, alert appropraite

## 2023-11-11 NOTE — ED ADULT NURSE NOTE - OBJECTIVE STATEMENT
Pt arrives to ED complaining of non traumatic left shoulder pain with nausea. Pt states "I want to make sure my heart is ok." Denies chest pain, denies SOB. Hx htn. Cardiac monitor in place, EKG completed. IV inserted to L AC, blood work sent. Wife at bedside. Safety and comfort measures maintained.

## 2023-11-11 NOTE — CONSULT NOTE ADULT - SUBJECTIVE AND OBJECTIVE BOX
CHIEF COMPLAINT: chest pain     HPI: Mr. Vasquez is an 87 yo male with a hx hypertension, CKD, dyslipidemia, recent COVID infection 9/2023      Follows with Dr. Guadalupe- was seen last by NP 11/1 for presyncope.   11/1/2023 TTE showed niormal LV function, no hemodynamically significant valvular disease.       PAST MEDICAL / SURGICAL HISTORY:  PAST MEDICAL & SURGICAL HISTORY:      SOCIAL HISTORY:   Alcohol: Denied  Smoking: Nonsmoker  Drug Use: Denied  Marital Status:     FAMILY HISTORY: FAMILY HISTORY:      MEDICATIONS  (STANDING):    MEDICATIONS  (PRN):      Allergies    No Known Allergies    Intolerances        REVIEW OF SYSTEMS:  CONSTITUTIONAL: No weakness, fevers or chills  Eyes: No visual changes  NECK: No pain or stiffness  RESPIRATORY: No cough, wheezing, hemoptysis; No shortness of breath  CARDIOVASCULAR: No chest pain or palpitations  GASTROINTESTINAL: No abdominal pain. No nausea, vomiting, or hematemesis; No diarrhea or constipation. No melena or hematochezia.  GENITOURINARY: No dysuria, frequency or hematuria  NEUROLOGICAL: No numbness.  SKIN: No itching or rash  All other review of systems is negative unless indicated above    VITAL SIGNS:   Vital Signs Last 24 Hrs  T(C): 36.8 (11 Nov 2023 11:09), Max: 36.8 (11 Nov 2023 11:09)  T(F): 98.3 (11 Nov 2023 11:09), Max: 98.3 (11 Nov 2023 11:09)  HR: 66 (11 Nov 2023 11:09) (66 - 66)  BP: 132/78 (11 Nov 2023 11:09) (132/78 - 132/78)  BP(mean): 94 (11 Nov 2023 11:09) (94 - 94)  RR: 18 (11 Nov 2023 11:09) (18 - 18)  SpO2: 99% (11 Nov 2023 11:09) (99% - 99%)    Parameters below as of 11 Nov 2023 11:09  Patient On (Oxygen Delivery Method): room air        I&O's Summary      PHYSICAL EXAM:  Constitutional: NAD, awake and alert  HEENT:  EOMI,  Pupils round, No oral cyanosis.  Pulmonary: Non-labored, breath sounds are clear bilaterally, No wheezing, rales or rhonchi  Cardiovascular: S1 and S2, regular rate and rhythm, no Murmurs, gallops or rubs  Gastrointestinal: Bowel Sounds present, soft, nontender.   Lymph: No peripheral edema. No cervical lymphadenopathy.  Neurological: Alert, no focal deficits  Skin: No rashes.  Psych:  Mood & affect appropriate    LABS:                        15.6   14.25 )-----------( 249      ( 11 Nov 2023 11:47 )             46.5     11 Nov 2023 11:47    139    |  106    |  16     ----------------------------<  123    3.9     |  25     |  1.54     Ca    8.8        11 Nov 2023 11:47    TPro  6.8    /  Alb  3.4    /  TBili  0.8    /  DBili  x      /  AST  208    /  ALT  47     /  AlkPhos  52     11 Nov 2023 11:47    PT/INR - ( 11 Nov 2023 11:47 )   PT: 10.9 sec;   INR: 0.96 ratio         PTT - ( 11 Nov 2023 11:47 )  PTT:29.2 sec             CHIEF COMPLAINT: chest pain     HPI: Mr. Vasquez is an 89 yo male with a hx hypertension, CKD, dyslipidemia, recent COVID infection 2023 presenting with left sided chest disomfort x1 day. Pt follows with Dr. Guadalupe- was seen last by NP  for presyncope. He had an episode of dizziness, nausea/vomiting while out. Felt improved so declined EMS transfer to ED. He was ordered for MCOT, echocardiogram from  which showed normal LV function and no significant valvular disease. MCOT still in place (battery  per pt). He has not had recent exertional angina/dyspnea. Was exercising on a treadmill 2 days ago without symptoms. Yesterday, he experienced nausea and fatigue. Today he reports left sided discomfort with radiation to left shoulder.     In the ED, pt is hemodynamically stable saturating well on room air.   Labs notable for Hs trop 35,909, , Cr 1.54, WBC 14.   ECG shows NSR with RBBB, inferolateral REGINALD with Q waves     Per ED, interventional cardiologist was consulted who recommended against emergent catheterization due to infarct pattern.        PAST MEDICAL / SURGICAL HISTORY:  PAST MEDICAL & SURGICAL HISTORY:      SOCIAL HISTORY:   Alcohol: Denied  Smoking: Nonsmoker  Drug Use: Denied  Marital Status:     FAMILY HISTORY: FAMILY HISTORY:      MEDICATIONS  (STANDING):    MEDICATIONS  (PRN):      Allergies    No Known Allergies    Intolerances        REVIEW OF SYSTEMS:  CONSTITUTIONAL: No weakness, fevers or chills  Eyes: No visual changes  NECK: No pain or stiffness  RESPIRATORY: No cough, wheezing, hemoptysis; No shortness of breath  CARDIOVASCULAR: + chest pain, palpitations  GASTROINTESTINAL: No abdominal pain. No nausea, vomiting, or hematemesis; No diarrhea or constipation. No melena or hematochezia.  GENITOURINARY: No dysuria, frequency or hematuria  NEUROLOGICAL: No numbness.  SKIN: No itching or rash  All other review of systems is negative unless indicated above    VITAL SIGNS:   Vital Signs Last 24 Hrs  T(C): 36.8 (2023 11:09), Max: 36.8 (2023 11:09)  T(F): 98.3 (2023 11:09), Max: 98.3 (2023 11:09)  HR: 66 (2023 11:09) (66 - 66)  BP: 132/78 (2023 11:09) (132/78 - 132/78)  BP(mean): 94 (2023 11:09) (94 - 94)  RR: 18 (2023 11:09) (18 - 18)  SpO2: 99% (2023 11:09) (99% - 99%)    Parameters below as of 2023 11:09  Patient On (Oxygen Delivery Method): room air        I&O's Summary      PHYSICAL EXAM:  Constitutional: NAD, awake and alert  HEENT:  EOMI,  Pupils round, No oral cyanosis.  Pulmonary: Non-labored, breath sounds are clear bilaterally, No wheezing, rales or rhonchi  Cardiovascular: S1 and S2, regular rate and rhythm, no Murmurs, gallops or rubs  Gastrointestinal: Bowel Sounds present, soft, nontender.   Lymph: No peripheral edema. No cervical lymphadenopathy.  Neurological: Alert, no focal deficits  Skin: No rashes.  Psych:  Mood & affect appropriate    LABS:                        15.6   14.25 )-----------( 249      ( 2023 11:47 )             46.5     2023 11:47    139    |  106    |  16     ----------------------------<  123    3.9     |  25     |  1.54     Ca    8.8        2023 11:47    TPro  6.8    /  Alb  3.4    /  TBili  0.8    /  DBili  x      /  AST  208    /  ALT  47     /  AlkPhos  52     2023 11:47    PT/INR - ( 2023 11:47 )   PT: 10.9 sec;   INR: 0.96 ratio         PTT - ( 2023 11:47 )  PTT:29.2 sec

## 2023-11-12 DIAGNOSIS — I48.0 PAROXYSMAL ATRIAL FIBRILLATION: ICD-10-CM

## 2023-11-12 LAB
A1C WITH ESTIMATED AVERAGE GLUCOSE RESULT: 5.6 % — SIGNIFICANT CHANGE UP (ref 4–5.6)
A1C WITH ESTIMATED AVERAGE GLUCOSE RESULT: 5.6 % — SIGNIFICANT CHANGE UP (ref 4–5.6)
ADD ON TEST-SPECIMEN IN LAB: SIGNIFICANT CHANGE UP
ADD ON TEST-SPECIMEN IN LAB: SIGNIFICANT CHANGE UP
ANION GAP SERPL CALC-SCNC: 6 MMOL/L — SIGNIFICANT CHANGE UP (ref 5–17)
ANION GAP SERPL CALC-SCNC: 6 MMOL/L — SIGNIFICANT CHANGE UP (ref 5–17)
BUN SERPL-MCNC: 27 MG/DL — HIGH (ref 7–23)
BUN SERPL-MCNC: 27 MG/DL — HIGH (ref 7–23)
CALCIUM SERPL-MCNC: 8.2 MG/DL — LOW (ref 8.5–10.1)
CALCIUM SERPL-MCNC: 8.2 MG/DL — LOW (ref 8.5–10.1)
CHLORIDE SERPL-SCNC: 108 MMOL/L — SIGNIFICANT CHANGE UP (ref 96–108)
CHLORIDE SERPL-SCNC: 108 MMOL/L — SIGNIFICANT CHANGE UP (ref 96–108)
CHOLEST SERPL-MCNC: 113 MG/DL — SIGNIFICANT CHANGE UP
CHOLEST SERPL-MCNC: 113 MG/DL — SIGNIFICANT CHANGE UP
CO2 SERPL-SCNC: 26 MMOL/L — SIGNIFICANT CHANGE UP (ref 22–31)
CO2 SERPL-SCNC: 26 MMOL/L — SIGNIFICANT CHANGE UP (ref 22–31)
CREAT SERPL-MCNC: 1.71 MG/DL — HIGH (ref 0.5–1.3)
CREAT SERPL-MCNC: 1.71 MG/DL — HIGH (ref 0.5–1.3)
EGFR: 38 ML/MIN/1.73M2 — LOW
EGFR: 38 ML/MIN/1.73M2 — LOW
ESTIMATED AVERAGE GLUCOSE: 114 MG/DL — SIGNIFICANT CHANGE UP (ref 68–114)
ESTIMATED AVERAGE GLUCOSE: 114 MG/DL — SIGNIFICANT CHANGE UP (ref 68–114)
GLUCOSE SERPL-MCNC: 109 MG/DL — HIGH (ref 70–99)
GLUCOSE SERPL-MCNC: 109 MG/DL — HIGH (ref 70–99)
HCT VFR BLD CALC: 43.5 % — SIGNIFICANT CHANGE UP (ref 39–50)
HCT VFR BLD CALC: 43.5 % — SIGNIFICANT CHANGE UP (ref 39–50)
HDLC SERPL-MCNC: 60 MG/DL — SIGNIFICANT CHANGE UP
HDLC SERPL-MCNC: 60 MG/DL — SIGNIFICANT CHANGE UP
HGB BLD-MCNC: 14.4 G/DL — SIGNIFICANT CHANGE UP (ref 13–17)
HGB BLD-MCNC: 14.4 G/DL — SIGNIFICANT CHANGE UP (ref 13–17)
LIPID PNL WITH DIRECT LDL SERPL: 38 MG/DL — SIGNIFICANT CHANGE UP
LIPID PNL WITH DIRECT LDL SERPL: 38 MG/DL — SIGNIFICANT CHANGE UP
MAGNESIUM SERPL-MCNC: 2.7 MG/DL — HIGH (ref 1.6–2.6)
MAGNESIUM SERPL-MCNC: 2.7 MG/DL — HIGH (ref 1.6–2.6)
MCHC RBC-ENTMCNC: 30.8 PG — SIGNIFICANT CHANGE UP (ref 27–34)
MCHC RBC-ENTMCNC: 30.8 PG — SIGNIFICANT CHANGE UP (ref 27–34)
MCHC RBC-ENTMCNC: 33.1 GM/DL — SIGNIFICANT CHANGE UP (ref 32–36)
MCHC RBC-ENTMCNC: 33.1 GM/DL — SIGNIFICANT CHANGE UP (ref 32–36)
MCV RBC AUTO: 92.9 FL — SIGNIFICANT CHANGE UP (ref 80–100)
MCV RBC AUTO: 92.9 FL — SIGNIFICANT CHANGE UP (ref 80–100)
NON HDL CHOLESTEROL: 54 MG/DL — SIGNIFICANT CHANGE UP
NON HDL CHOLESTEROL: 54 MG/DL — SIGNIFICANT CHANGE UP
PHOSPHATE SERPL-MCNC: 5.3 MG/DL — HIGH (ref 2.5–4.5)
PHOSPHATE SERPL-MCNC: 5.3 MG/DL — HIGH (ref 2.5–4.5)
PLATELET # BLD AUTO: 232 K/UL — SIGNIFICANT CHANGE UP (ref 150–400)
PLATELET # BLD AUTO: 232 K/UL — SIGNIFICANT CHANGE UP (ref 150–400)
POTASSIUM SERPL-MCNC: 4.1 MMOL/L — SIGNIFICANT CHANGE UP (ref 3.5–5.3)
POTASSIUM SERPL-MCNC: 4.1 MMOL/L — SIGNIFICANT CHANGE UP (ref 3.5–5.3)
POTASSIUM SERPL-SCNC: 4.1 MMOL/L — SIGNIFICANT CHANGE UP (ref 3.5–5.3)
POTASSIUM SERPL-SCNC: 4.1 MMOL/L — SIGNIFICANT CHANGE UP (ref 3.5–5.3)
RBC # BLD: 4.68 M/UL — SIGNIFICANT CHANGE UP (ref 4.2–5.8)
RBC # BLD: 4.68 M/UL — SIGNIFICANT CHANGE UP (ref 4.2–5.8)
RBC # FLD: 13.9 % — SIGNIFICANT CHANGE UP (ref 10.3–14.5)
RBC # FLD: 13.9 % — SIGNIFICANT CHANGE UP (ref 10.3–14.5)
SODIUM SERPL-SCNC: 140 MMOL/L — SIGNIFICANT CHANGE UP (ref 135–145)
SODIUM SERPL-SCNC: 140 MMOL/L — SIGNIFICANT CHANGE UP (ref 135–145)
TRIGL SERPL-MCNC: 80 MG/DL — SIGNIFICANT CHANGE UP
TRIGL SERPL-MCNC: 80 MG/DL — SIGNIFICANT CHANGE UP
TROPONIN I, HIGH SENSITIVITY RESULT: HIGH NG/L
TROPONIN I, HIGH SENSITIVITY RESULT: HIGH NG/L
TSH SERPL-MCNC: 0.34 UU/ML — SIGNIFICANT CHANGE UP (ref 0.34–4.82)
TSH SERPL-MCNC: 0.34 UU/ML — SIGNIFICANT CHANGE UP (ref 0.34–4.82)
WBC # BLD: 14.71 K/UL — HIGH (ref 3.8–10.5)
WBC # BLD: 14.71 K/UL — HIGH (ref 3.8–10.5)
WBC # FLD AUTO: 14.71 K/UL — HIGH (ref 3.8–10.5)
WBC # FLD AUTO: 14.71 K/UL — HIGH (ref 3.8–10.5)

## 2023-11-12 PROCEDURE — 99233 SBSQ HOSP IP/OBS HIGH 50: CPT

## 2023-11-12 PROCEDURE — 99232 SBSQ HOSP IP/OBS MODERATE 35: CPT

## 2023-11-12 RX ORDER — POTASSIUM PHOSPHATE, MONOBASIC POTASSIUM PHOSPHATE, DIBASIC 236; 224 MG/ML; MG/ML
30 INJECTION, SOLUTION INTRAVENOUS ONCE
Refills: 0 | Status: COMPLETED | OUTPATIENT
Start: 2023-11-12 | End: 2023-11-12

## 2023-11-12 RX ORDER — POTASSIUM CHLORIDE 20 MEQ
10 PACKET (EA) ORAL
Refills: 0 | Status: DISCONTINUED | OUTPATIENT
Start: 2023-11-12 | End: 2023-11-12

## 2023-11-12 RX ORDER — INFLUENZA VIRUS VACCINE 15; 15; 15; 15 UG/.5ML; UG/.5ML; UG/.5ML; UG/.5ML
0.7 SUSPENSION INTRAMUSCULAR ONCE
Refills: 0 | Status: DISCONTINUED | OUTPATIENT
Start: 2023-11-12 | End: 2023-11-14

## 2023-11-12 RX ADMIN — Medication 81 MILLIGRAM(S): at 10:07

## 2023-11-12 RX ADMIN — Medication 25 MILLIGRAM(S): at 21:30

## 2023-11-12 RX ADMIN — BRIMONIDINE TARTRATE, TIMOLOL MALEATE 1 DROP(S): 2; 5 SOLUTION/ DROPS OPHTHALMIC at 21:30

## 2023-11-12 RX ADMIN — BRIMONIDINE TARTRATE, TIMOLOL MALEATE 1 DROP(S): 2; 5 SOLUTION/ DROPS OPHTHALMIC at 10:07

## 2023-11-12 RX ADMIN — Medication 25 MILLIGRAM(S): at 10:06

## 2023-11-12 RX ADMIN — LATANOPROST 1 DROP(S): 0.05 SOLUTION/ DROPS OPHTHALMIC; TOPICAL at 01:02

## 2023-11-12 RX ADMIN — DORZOLAMIDE HYDROCHLORIDE TIMOLOL MALEATE 1 DROP(S): 20; 5 SOLUTION/ DROPS OPHTHALMIC at 21:29

## 2023-11-12 RX ADMIN — DORZOLAMIDE HYDROCHLORIDE TIMOLOL MALEATE 1 DROP(S): 20; 5 SOLUTION/ DROPS OPHTHALMIC at 01:01

## 2023-11-12 RX ADMIN — DORZOLAMIDE HYDROCHLORIDE TIMOLOL MALEATE 1 DROP(S): 20; 5 SOLUTION/ DROPS OPHTHALMIC at 10:07

## 2023-11-12 RX ADMIN — CLOPIDOGREL BISULFATE 75 MILLIGRAM(S): 75 TABLET, FILM COATED ORAL at 11:13

## 2023-11-12 RX ADMIN — AMLODIPINE BESYLATE 10 MILLIGRAM(S): 2.5 TABLET ORAL at 10:07

## 2023-11-12 RX ADMIN — Medication 25 GRAM(S): at 01:32

## 2023-11-12 RX ADMIN — POTASSIUM PHOSPHATE, MONOBASIC POTASSIUM PHOSPHATE, DIBASIC 83.33 MILLIMOLE(S): 236; 224 INJECTION, SOLUTION INTRAVENOUS at 01:02

## 2023-11-12 RX ADMIN — ATORVASTATIN CALCIUM 80 MILLIGRAM(S): 80 TABLET, FILM COATED ORAL at 21:30

## 2023-11-12 RX ADMIN — LATANOPROST 1 DROP(S): 0.05 SOLUTION/ DROPS OPHTHALMIC; TOPICAL at 21:30

## 2023-11-12 NOTE — PROGRESS NOTE ADULT - SUBJECTIVE AND OBJECTIVE BOX
Events Overnight:  Patient had cath yesterday and got stent to PDA, creatinine 1.7 , no further chest pain    HPI:       88M with pmhx hld, CKD2, recent near syncopal event, brought to the ED for dyspepsia and chest/L shoulder pain. ECG revealing of REGINALD in II, III, AVF, V4-6 and Q qaves. Trops >35K. He was admitted for treatment of acute STEMI. He is now s/p PCI with LUDMILA x1 to RPDA.   had brief episode afib post op, now back in nsr  troponin peaked at [id number], now decreasing  no further chest pain  echo ef 50%, inferior hypokinesis     ROS:   CONSTITUTIONAL: (-) fever, (-) chills  RESPIRATORY: (-) SOB, (-) cough  CV: (+) chest pain, (-) palpitations  GI: (-) abdominal pain, (-) nausea, (-) vomiting, (-) diarrhea, (-) constipation   NEURO: (-) headache, (-) weakness, (-) visual changes    PAST MEDICAL & SURGICAL HISTORY:  Stage 2 chronic kidney disease  Hypercholesteremia  Mild HTN    MEDICATIONS  (STANDING):  amLODIPine   Tablet 10 milliGRAM(s) Oral daily  aspirin  chewable 81 milliGRAM(s) Oral daily  atorvastatin 80 milliGRAM(s) Oral at bedtime  brimonidine 0.2%/ timolol 0.5% Ophthalmic Solution 1 Drop(s) Both EYES every 12 hours  clopidogrel Tablet 75 milliGRAM(s) Oral daily  dorzolamide 2%/timolol 0.5% Ophthalmic Solution 1 Drop(s) Both EYES two times a day  latanoprost 0.005% Ophthalmic Solution 1 Drop(s) Both EYES at bedtime  metoprolol tartrate 25 milliGRAM(s) Oral two times a day    MEDICATIONS  (PRN):  acetaminophen     Tablet .. 650 milliGRAM(s) Oral every 6 hours PRN Mild Pain (1 - 3)  morphine  - Injectable 2 milliGRAM(s) IV Push every 3 hours PRN Moderate Pain (4 - 6)    Height (cm): 167.6 (11-11 @ 10:59)  Weight (kg): 68.2 (11-11 @ 13:24)  BMI (kg/m2): 24.3 (11-11 @ 13:24)    ICU Vital Signs Last 24 Hrs  T(C): 37.1 (12 Nov 2023 05:49), Max: 37.7 (11 Nov 2023 16:32)  T(F): 98.8 (12 Nov 2023 05:49), Max: 99.9 (11 Nov 2023 16:32)  HR: 69 (12 Nov 2023 08:00) (66 - 122)  BP: 123/57 (12 Nov 2023 08:00) (82/62 - 145/81)  BP(mean): 77 (12 Nov 2023 08:00) (70 - 106)  ABP: --  ABP(mean): --  RR: 13 (12 Nov 2023 08:00) (10 - 29)  SpO2: 95% (12 Nov 2023 08:00) (90% - 99%)    O2 Parameters below as of 11 Nov 2023 11:09  Patient On (Oxygen Delivery Method): room air    Physical Exam    General - no distress  HEENT nc/at  neck no jvd  Lungs clear  cv rrr  abdomen soft, non tender  extremities warm no edema  neuro awake, alert no edema   voiding                        14.4   14.71 )-----------( 232      ( 12 Nov 2023 05:43 )             43.5       11-12    140  |  108  |  27<H>  ----------------------------<  109<H>  4.1   |  26  |  1.71<H>    Ca    8.2<L>      12 Nov 2023 05:43  Phos  5.3     11-12  Mg     2.7     11-12    TPro  6.8  /  Alb  3.4  /  TBili  0.8  /  DBili  x   /  AST  208<H>  /  ALT  47  /  AlkPhos  52  11-11    Urinalysis Basic - ( 12 Nov 2023 05:43 )    Color: x / Appearance: x / SG: x / pH: x  Gluc: 109 mg/dL / Ketone: x  / Bili: x / Urobili: x   Blood: x / Protein: x / Nitrite: x   Leuk Esterase: x / RBC: x / WBC x   Sq Epi: x / Non Sq Epi: x / Bacteria: x    DVT Prophylaxis: Ambulate                                                                 Advanced Directives: Full Code

## 2023-11-12 NOTE — PATIENT PROFILE ADULT - NSPROGENOTHERPROVIDER_GEN_A_NUR
none Peripheral Edema       Peripheral edema is swelling that is caused by a buildup of fluid. Peripheral edema most often affects the lower legs, ankles, and feet. It can also develop in the arms, hands, and face. The area of the body that has peripheral edema will look swollen. It may also feel heavy or warm. Your clothes may start to feel tight. Pressing on the area may make a temporary dent in your skin. You may not be able to move your swollen arm or leg as much as usual.    There are many causes of peripheral edema. It can happen because of a complication of other conditions such as congestive heart failure, kidney disease, or a problem with your blood circulation. It also can be a side effect of certain medicines or because of an infection. It often happens to women during pregnancy. Sometimes, the cause is not known.      Follow these instructions at home:      Managing pain, stiffness, and swelling      •Raise (elevate) your legs while you are sitting or lying down.      •Move around often to prevent stiffness and to lessen swelling.      • Do not sit or stand for long periods of time.      •Wear support stockings as told by your health care provider.      Medicines     •Take over-the-counter and prescription medicines only as told by your health care provider.      •Your health care provider may prescribe medicine to help your body get rid of excess water (diuretic).      General instructions     •Pay attention to any changes in your symptoms.      •Follow instructions from your health care provider about limiting salt (sodium) in your diet. Sometimes, eating less salt may reduce swelling.      •Moisturize skin daily to help prevent skin from cracking and draining.      •Keep all follow-up visits as told by your health care provider. This is important.        Contact a health care provider if you have:    •A fever.      •Edema that starts suddenly or is getting worse, especially if you are pregnant or have a medical condition.      •Swelling in only one leg.      •Increased swelling, redness, or pain in one or both of your legs.      •Drainage or sores at the area where you have edema.        Get help right away if you:    •Develop shortness of breath, especially when you are lying down.      •Have pain in your chest or abdomen.      •Feel weak.      •Feel faint.        Summary    •Peripheral edema is swelling that is caused by a buildup of fluid. Peripheral edema most often affects the lower legs, ankles, and feet.      •Move around often to prevent stiffness and to lessen swelling. Do not sit or stand for long periods of time.      •Pay attention to any changes in your symptoms.      •Contact a health care provider if you have edema that starts suddenly or is getting worse, especially if you are pregnant or have a medical condition.      •Get help right away if you develop shortness of breath, especially when lying down.      This information is not intended to replace advice given to you by your health care provider. Make sure you discuss any questions you have with your health care provider.      Document Revised: 09/11/2019 Document Reviewed: 09/11/2019    Elsevier Patient Education © 2021 [employer]

## 2023-11-12 NOTE — PROGRESS NOTE ADULT - PROBLEM SELECTOR PLAN 1
Pt has acute chest and left shoulder pain x1 day.   ECG with inferolateral REGINALD and q waves. Hs trop 35,000 then peaked at 115K   Persistent mild discomfort in ED which has since resolved.   He has no hx CAD. Recent evaluation for presyncope 11/1 with normal echocardiogram. MCOT in progress  s/p cath with Dr. Dr. Sinha on 11/11- diffuse disease, s/p PCI to PDA   lipid panel and A1c pending  TTE shows low normal LV function with inferoseptal WMA    -cont aspirin and plavix, atorvastatin 80mg   Started metoprolol tartrate 25 mg BID, tolerating well. transition to once daily succinate on discharge  Anticipated DC on 11/13. Will need follow up with his cardiologist, Dr. Guadalupe Pt has acute chest and left shoulder pain x1 day.   ECG with inferolateral REGINALD and q waves. Hs trop 35,000 then peaked at 115K   Persistent mild discomfort in ED which has since resolved.   He has no hx CAD. Recent evaluation for presyncope 11/1 with normal echocardiogram. MCOT in progress  s/p cath with Dr. Dr. Sinha on 11/11- diffuse disease, s/p PCI to PDA. Will review case with Dr. Zazueta for possible staged PCI on this admission vs as outpatient   lipid panel and A1c pending  TTE shows low normal LV function with inferoseptal WMA    -cont aspirin and plavix, atorvastatin 80mg   Started metoprolol tartrate 25 mg BID, tolerating well. transition to once daily succinate on discharge

## 2023-11-12 NOTE — PATIENT PROFILE ADULT - FALL HARM RISK - RISK INTERVENTIONS
Assistance OOB with selected safe patient handling equipment/Communicate Fall Risk and Risk Factors to all staff, patient, and family/Reinforce activity limits and safety measures with patient and family/Review medications for side effects contributing to fall risk/Sit up slowly, dangle for a short time, stand at bedside before walking/Toileting schedule using arm’s reach rule for commode and bathroom/Visual Cue: Yellow wristband/Bed in lowest position, wheels locked, appropriate side rails in place/Call bell, personal items and telephone in reach/Instruct patient to call for assistance before getting out of bed or chair/Non-slip footwear when patient is out of bed/[place] to call system/Physically safe environment - no spills, clutter or unnecessary equipment/Purposeful Proactive Rounding/Room/bathroom lighting operational, light cord in reach

## 2023-11-12 NOTE — PROGRESS NOTE ADULT - ASSESSMENT
Patient admitted with chest pain, r/o stemi  s/p stent to [facility]  troponin now decreasing peaked at [id number]  risk factors, hypercholesterolemia, HTN, family hx.  no further chest pain  brief episode of afib    Plan  1. aspirin  2. Plavix  3. No ac unless afib recus  4. bblocker,   5. echo ef 50%  6. atorvastatin  ldl 38  hgb a1c pending, no hx. of diabetes

## 2023-11-12 NOTE — PROGRESS NOTE ADULT - SUBJECTIVE AND OBJECTIVE BOX
CHIEF COMPLAINT: chest pain     HPI: Mr. Vasquez is an 89 yo male with a hx hypertension, CKD, dyslipidemia, recent COVID infection 2023 presenting with left sided chest disomfort x1 day. Pt follows with Dr. Guadalupe- was seen last by NP  for presyncope. He had an episode of dizziness, nausea/vomiting while out. Felt improved so declined EMS transfer to ED. He was ordered for MCOT, echocardiogram from  which showed normal LV function and no significant valvular disease. MCOT still in place (battery  per pt). He has not had recent exertional angina/dyspnea. Was exercising on a treadmill 2 days ago without symptoms. Yesterday, he experienced nausea and fatigue. Today he reports left sided discomfort with radiation to left shoulder.     In the ED, pt is hemodynamically stable saturating well on room air.   Labs notable for Hs trop 35,909, , Cr 1.54, WBC 14.   ECG shows NSR with RBBB, inferolateral REGINALD with Q waves     Per ED, interventional cardiologist was consulted who recommended against emergent catheterization due to infarct pattern.      : s/p PCI to PDA on . Pt had brief episode of Afib overnight on telemetry     PAST MEDICAL / SURGICAL HISTORY:  PAST MEDICAL & SURGICAL HISTORY:      SOCIAL HISTORY:   Alcohol: Denied  Smoking: Nonsmoker  Drug Use: Denied  Marital Status:     FAMILY HISTORY: FAMILY HISTORY:      MEDICATIONS  (STANDING):    MEDICATIONS  (PRN):      Allergies    No Known Allergies    Intolerances          VITAL SIGNS:   Vital Signs Last 24 Hrs  T(C): 36.8 (2023 11:09), Max: 36.8 (2023 11:09)  T(F): 98.3 (2023 11:09), Max: 98.3 (2023 11:09)  HR: 66 (2023 11:09) (66 - 66)  BP: 132/78 (2023 11:09) (132/78 - 132/78)  BP(mean): 94 (2023 11:09) (94 - 94)  RR: 18 (2023 11:09) (18 - 18)  SpO2: 99% (2023 11:09) (99% - 99%)    Parameters below as of 2023 11:09  Patient On (Oxygen Delivery Method): room air        I&O's Summary      PHYSICAL EXAM:  Constitutional: NAD, drowsy   HEENT:  EOMI,  Pupils round, No oral cyanosis.  Pulmonary: Non-labored, breath sounds are clear bilaterally, No wheezing, rales or rhonchi  Cardiovascular: S1 and S2, regular rate and rhythm, no Murmurs, gallops or rubs  Gastrointestinal: Bowel Sounds present, soft, nontender.   Lymph: No peripheral edema. No cervical lymphadenopathy.  Neurological: Alert, no focal deficits  Skin: No rashes.  Psych:  Mood & affect appropriate    LABS:                        15.6   14.25 )-----------( 249      ( 2023 11:47 )             46.5     2023 11:47    139    |  106    |  16     ----------------------------<  123    3.9     |  25     |  1.54     Ca    8.8        2023 11:47    TPro  6.8    /  Alb  3.4    /  TBili  0.8    /  DBili  x      /  AST  208    /  ALT  47     /  AlkPhos  52     2023 11:47    PT/INR - ( 2023 11:47 )   PT: 10.9 sec;   INR: 0.96 ratio         PTT - ( 2023 11:47 )  PTT:29.2 sec      < from: TTE Echo Complete w/o Contrast w/ Doppler (23 @ 13:57) >     Impression     Summary     Normal left ventricular size with mild systolic dysfunction; the   inferoseptum and inferior wall appear hypokinetic. Estimated left   ventricular ejection fraction is 50%.   Mild diastolic dysfunction (stage I).   Aortic valve sclerosis.     Signature     ----------------------------------------------------------------   Electronically signed by Bernardo Guadalupe MD(Interpreting   physician) on 2023 02:20 PM   ----------------------------------------------------------------    < end of copied text >

## 2023-11-13 LAB
ANION GAP SERPL CALC-SCNC: 3 MMOL/L — LOW (ref 5–17)
ANION GAP SERPL CALC-SCNC: 3 MMOL/L — LOW (ref 5–17)
ANION GAP SERPL CALC-SCNC: 5 MMOL/L — SIGNIFICANT CHANGE UP (ref 5–17)
ANION GAP SERPL CALC-SCNC: 5 MMOL/L — SIGNIFICANT CHANGE UP (ref 5–17)
BUN SERPL-MCNC: 32 MG/DL — HIGH (ref 7–23)
BUN SERPL-MCNC: 32 MG/DL — HIGH (ref 7–23)
BUN SERPL-MCNC: 33 MG/DL — HIGH (ref 7–23)
BUN SERPL-MCNC: 33 MG/DL — HIGH (ref 7–23)
CALCIUM SERPL-MCNC: 8 MG/DL — LOW (ref 8.5–10.1)
CALCIUM SERPL-MCNC: 8 MG/DL — LOW (ref 8.5–10.1)
CALCIUM SERPL-MCNC: 8.4 MG/DL — LOW (ref 8.5–10.1)
CALCIUM SERPL-MCNC: 8.4 MG/DL — LOW (ref 8.5–10.1)
CHLORIDE SERPL-SCNC: 104 MMOL/L — SIGNIFICANT CHANGE UP (ref 96–108)
CHLORIDE SERPL-SCNC: 104 MMOL/L — SIGNIFICANT CHANGE UP (ref 96–108)
CHLORIDE SERPL-SCNC: 108 MMOL/L — SIGNIFICANT CHANGE UP (ref 96–108)
CHLORIDE SERPL-SCNC: 108 MMOL/L — SIGNIFICANT CHANGE UP (ref 96–108)
CO2 SERPL-SCNC: 27 MMOL/L — SIGNIFICANT CHANGE UP (ref 22–31)
CREAT SERPL-MCNC: 1.93 MG/DL — HIGH (ref 0.5–1.3)
CREAT SERPL-MCNC: 1.93 MG/DL — HIGH (ref 0.5–1.3)
CREAT SERPL-MCNC: 2.02 MG/DL — HIGH (ref 0.5–1.3)
CREAT SERPL-MCNC: 2.02 MG/DL — HIGH (ref 0.5–1.3)
EGFR: 32 ML/MIN/1.73M2 — LOW
EGFR: 32 ML/MIN/1.73M2 — LOW
EGFR: 33 ML/MIN/1.73M2 — LOW
EGFR: 33 ML/MIN/1.73M2 — LOW
GLUCOSE SERPL-MCNC: 103 MG/DL — HIGH (ref 70–99)
GLUCOSE SERPL-MCNC: 103 MG/DL — HIGH (ref 70–99)
GLUCOSE SERPL-MCNC: 133 MG/DL — HIGH (ref 70–99)
GLUCOSE SERPL-MCNC: 133 MG/DL — HIGH (ref 70–99)
HCT VFR BLD CALC: 40.3 % — SIGNIFICANT CHANGE UP (ref 39–50)
HCT VFR BLD CALC: 40.3 % — SIGNIFICANT CHANGE UP (ref 39–50)
HGB BLD-MCNC: 13.3 G/DL — SIGNIFICANT CHANGE UP (ref 13–17)
HGB BLD-MCNC: 13.3 G/DL — SIGNIFICANT CHANGE UP (ref 13–17)
MCHC RBC-ENTMCNC: 30.6 PG — SIGNIFICANT CHANGE UP (ref 27–34)
MCHC RBC-ENTMCNC: 30.6 PG — SIGNIFICANT CHANGE UP (ref 27–34)
MCHC RBC-ENTMCNC: 33 GM/DL — SIGNIFICANT CHANGE UP (ref 32–36)
MCHC RBC-ENTMCNC: 33 GM/DL — SIGNIFICANT CHANGE UP (ref 32–36)
MCV RBC AUTO: 92.9 FL — SIGNIFICANT CHANGE UP (ref 80–100)
MCV RBC AUTO: 92.9 FL — SIGNIFICANT CHANGE UP (ref 80–100)
PLATELET # BLD AUTO: 218 K/UL — SIGNIFICANT CHANGE UP (ref 150–400)
PLATELET # BLD AUTO: 218 K/UL — SIGNIFICANT CHANGE UP (ref 150–400)
POTASSIUM SERPL-MCNC: 3.8 MMOL/L — SIGNIFICANT CHANGE UP (ref 3.5–5.3)
POTASSIUM SERPL-MCNC: 3.8 MMOL/L — SIGNIFICANT CHANGE UP (ref 3.5–5.3)
POTASSIUM SERPL-MCNC: 4.1 MMOL/L — SIGNIFICANT CHANGE UP (ref 3.5–5.3)
POTASSIUM SERPL-MCNC: 4.1 MMOL/L — SIGNIFICANT CHANGE UP (ref 3.5–5.3)
POTASSIUM SERPL-SCNC: 3.8 MMOL/L — SIGNIFICANT CHANGE UP (ref 3.5–5.3)
POTASSIUM SERPL-SCNC: 3.8 MMOL/L — SIGNIFICANT CHANGE UP (ref 3.5–5.3)
POTASSIUM SERPL-SCNC: 4.1 MMOL/L — SIGNIFICANT CHANGE UP (ref 3.5–5.3)
POTASSIUM SERPL-SCNC: 4.1 MMOL/L — SIGNIFICANT CHANGE UP (ref 3.5–5.3)
RBC # BLD: 4.34 M/UL — SIGNIFICANT CHANGE UP (ref 4.2–5.8)
RBC # BLD: 4.34 M/UL — SIGNIFICANT CHANGE UP (ref 4.2–5.8)
RBC # FLD: 13.9 % — SIGNIFICANT CHANGE UP (ref 10.3–14.5)
RBC # FLD: 13.9 % — SIGNIFICANT CHANGE UP (ref 10.3–14.5)
SODIUM SERPL-SCNC: 136 MMOL/L — SIGNIFICANT CHANGE UP (ref 135–145)
SODIUM SERPL-SCNC: 136 MMOL/L — SIGNIFICANT CHANGE UP (ref 135–145)
SODIUM SERPL-SCNC: 138 MMOL/L — SIGNIFICANT CHANGE UP (ref 135–145)
SODIUM SERPL-SCNC: 138 MMOL/L — SIGNIFICANT CHANGE UP (ref 135–145)
TROPONIN I, HIGH SENSITIVITY RESULT: HIGH NG/L
TROPONIN I, HIGH SENSITIVITY RESULT: HIGH NG/L
WBC # BLD: 13.14 K/UL — HIGH (ref 3.8–10.5)
WBC # BLD: 13.14 K/UL — HIGH (ref 3.8–10.5)
WBC # FLD AUTO: 13.14 K/UL — HIGH (ref 3.8–10.5)
WBC # FLD AUTO: 13.14 K/UL — HIGH (ref 3.8–10.5)

## 2023-11-13 PROCEDURE — 99232 SBSQ HOSP IP/OBS MODERATE 35: CPT

## 2023-11-13 PROCEDURE — 99233 SBSQ HOSP IP/OBS HIGH 50: CPT

## 2023-11-13 RX ORDER — LATANOPROST 0.05 MG/ML
1 SOLUTION/ DROPS OPHTHALMIC; TOPICAL
Refills: 0 | DISCHARGE

## 2023-11-13 RX ORDER — BRIMONIDINE TARTRATE, TIMOLOL MALEATE 2; 5 MG/ML; MG/ML
1 SOLUTION/ DROPS OPHTHALMIC
Refills: 0 | DISCHARGE

## 2023-11-13 RX ADMIN — DORZOLAMIDE HYDROCHLORIDE TIMOLOL MALEATE 1 DROP(S): 20; 5 SOLUTION/ DROPS OPHTHALMIC at 22:37

## 2023-11-13 RX ADMIN — CLOPIDOGREL BISULFATE 75 MILLIGRAM(S): 75 TABLET, FILM COATED ORAL at 10:51

## 2023-11-13 RX ADMIN — BRIMONIDINE TARTRATE, TIMOLOL MALEATE 1 DROP(S): 2; 5 SOLUTION/ DROPS OPHTHALMIC at 10:48

## 2023-11-13 RX ADMIN — ATORVASTATIN CALCIUM 80 MILLIGRAM(S): 80 TABLET, FILM COATED ORAL at 22:00

## 2023-11-13 RX ADMIN — Medication 25 MILLIGRAM(S): at 22:00

## 2023-11-13 RX ADMIN — MORPHINE SULFATE 2 MILLIGRAM(S): 50 CAPSULE, EXTENDED RELEASE ORAL at 03:30

## 2023-11-13 RX ADMIN — Medication 81 MILLIGRAM(S): at 10:51

## 2023-11-13 RX ADMIN — DORZOLAMIDE HYDROCHLORIDE TIMOLOL MALEATE 1 DROP(S): 20; 5 SOLUTION/ DROPS OPHTHALMIC at 10:49

## 2023-11-13 RX ADMIN — MORPHINE SULFATE 2 MILLIGRAM(S): 50 CAPSULE, EXTENDED RELEASE ORAL at 03:43

## 2023-11-13 RX ADMIN — AMLODIPINE BESYLATE 10 MILLIGRAM(S): 2.5 TABLET ORAL at 10:51

## 2023-11-13 RX ADMIN — BRIMONIDINE TARTRATE, TIMOLOL MALEATE 1 DROP(S): 2; 5 SOLUTION/ DROPS OPHTHALMIC at 22:04

## 2023-11-13 RX ADMIN — Medication 25 MILLIGRAM(S): at 10:51

## 2023-11-13 RX ADMIN — LATANOPROST 1 DROP(S): 0.05 SOLUTION/ DROPS OPHTHALMIC; TOPICAL at 23:01

## 2023-11-13 NOTE — CHART NOTE - NSCHARTNOTEFT_GEN_A_CORE
Pt. was not offered cardiac rehab. Pt. to have staged PCI of LAD as ab outpatient. Pt. to follow-up with Dr. Zazueta in 2 weeks Pt. given educational sheet.

## 2023-11-13 NOTE — PROGRESS NOTE ADULT - PROBLEM SELECTOR PLAN 1
-Pt has acute chest and left shoulder pain x1 day.   -ECG with inferolateral REGINALD and q waves. Hs trop 35,000 then peaked at 115K  -Echo w/ EF 50% & inferior & inferosept. HK  -He has no hx CAD. Recent evaluation for presyncope 11/1 with normal echocardiogram. MCOT in progress  -s/p cath with Dr. Dr. Sinha on 11/11- diffuse disease, s/p PCI to PDA.   -Discussed case w/ Dr. Zazueta - pt has mid LAD & mid LCx 70% lesion - PCI reccomended electively when Cr improves unless unstable features are present.  could be considered as OP.      -lipid panel and A1c pending  -cont aspirin and plavix, atorvastatin 80mg  -Cont. metoprolol tartrate 25 mg BID, tolerating well. transition to once daily succinate on discharge.

## 2023-11-13 NOTE — PROGRESS NOTE ADULT - SUBJECTIVE AND OBJECTIVE BOX
CHIEF COMPLAINT: chest pain     HPI: Mr. Weldon is an 89 yo male with a hx hypertension, CKD, dyslipidemia, recent COVID infection 2023 presenting with left sided chest disomfort x1 day. Pt follows with Dr. Guadalupe- was seen last by NP  for presyncope. He had an episode of dizziness, nausea/vomiting while out. Felt improved so declined EMS transfer to ED. He was ordered for MCOT, echocardiogram from  which showed normal LV function and no significant valvular disease. MCOT still in place (battery  per pt). He has not had recent exertional angina/dyspnea. Was exercising on a treadmill 2 days ago without symptoms. Yesterday, he experienced nausea and fatigue. Today he reports left sided discomfort with radiation to left shoulder.     In the ED, pt is hemodynamically stable saturating well on room air.   Labs notable for Hs trop 35,909, , Cr 1.54, WBC 14.   ECG shows NSR with RBBB, inferolateral REGINALD with Q waves     Per ED, interventional cardiologist was consulted who recommended against emergent catheterization due to infarct pattern.      : s/p PCI to PDA on . Pt had brief episode of Afib overnight on telemetry   23: reported atypical L shoulder discomfort different than symptoms on presentation.  resolved after 10-15 minutes. tele - afib w/ RVR at 1a then terminated late this AM. pt asymptomatic during episode.    MEDICATIONS:  OUTPATIENT  Home Medications:  amLODIPine 10 mg oral tablet: 1 tab(s) orally once a day (2023 13:53)  Combigan 0.2%-0.5% ophthalmic solution: 1 drop(s) in each affected eye 2 times a day (2023 15:00)  Crestor 20 mg oral tablet: 1 tab(s) orally once a day (2023 13:52)  folic acid 1 mg oral tablet: 1 tab(s) orally once a day (2023 13:54)  hydroCHLOROthiazide 25 mg oral tablet: 1 tab(s) orally once a day (2023 15:01)  latanoprost 0.005% ophthalmic solution: 1 drop(s) in each eye once a day (at bedtime) (2023 15:02)      INPATIENT  MEDICATIONS  (STANDING):  amLODIPine   Tablet 10 milliGRAM(s) Oral daily  aspirin  chewable 81 milliGRAM(s) Oral daily  atorvastatin 80 milliGRAM(s) Oral at bedtime  brimonidine 0.2%/ timolol 0.5% Ophthalmic Solution 1 Drop(s) Both EYES every 12 hours  clopidogrel Tablet 75 milliGRAM(s) Oral daily  dorzolamide 2%/timolol 0.5% Ophthalmic Solution 1 Drop(s) Both EYES two times a day  influenza  Vaccine (HIGH DOSE) 0.7 milliLiter(s) IntraMuscular once  latanoprost 0.005% Ophthalmic Solution 1 Drop(s) Both EYES at bedtime  metoprolol tartrate 25 milliGRAM(s) Oral two times a day    MEDICATIONS  (PRN):  acetaminophen     Tablet .. 650 milliGRAM(s) Oral every 6 hours PRN Mild Pain (1 - 3)  morphine  - Injectable 2 milliGRAM(s) IV Push every 3 hours PRN Moderate Pain (4 - 6)          Vital Signs Last 24 Hrs  T(C): 36.8 (2023 13:27), Max: 37.3 (2023 04:29)  T(F): 98.3 (2023 13:27), Max: 99.1 (2023 04:29)  HR: 81 (2023 15:00) (71 - 141)  BP: 100/61 (2023 13:00) (72/45 - 116/65)  BP(mean): 74 (2023 13:00) (55 - 83)  RR: 19 (2023 14:00) (0 - 25)  SpO2: 97% (2023 09:00) (92% - 97%)    Daily     Daily I&O's Summary    2023 07:01  -  2023 07:00  --------------------------------------------------------  IN: 0 mL / OUT: 725 mL / NET: -725 mL        I&O's Detail    2023 07:01  -  2023 07:00  --------------------------------------------------------  IN:  Total IN: 0 mL    OUT:    Voided (mL): 725 mL  Total OUT: 725 mL    Total NET: -725 mL          I&O's Summary    2023 07:01  -  2023 07:00  --------------------------------------------------------  IN: 0 mL / OUT: 725 mL / NET: -725 mL      PHYSICAL EXAM:  Constitutional: NAD, drowsy   HEENT:  EOMI,  Pupils round, No oral cyanosis.  Pulmonary: Non-labored, breath sounds are clear bilaterally, No wheezing, rales or rhonchi  Cardiovascular: S1 and S2, regular rate and rhythm, no Murmurs, gallops or rubs  Gastrointestinal: Bowel Sounds present, soft, nontender.   Lymph: No peripheral edema. No cervical lymphadenopathy.  Neurological: Alert, no focal deficits  Skin: No rashes.  Psych:  Mood & affect appropriate        ===============================  ===============================  LABS:                         13.3   13.14 )-----------( 218      ( 2023 05:17 )             40.3                         14.4   14.71 )-----------( 232      ( 2023 05:43 )             43.5                         15.6   14.25 )-----------( 249      ( 2023 11:47 )             46.5     2023 14:57    136    |  104    |  32     ----------------------------<  133    3.8     |  27     |  2.02   2023 05:17    138    |  108    |  33     ----------------------------<  103    4.1     |  27     |  1.93   2023 05:43    140    |  108    |  27     ----------------------------<  109    4.1     |  26     |  1.71     Ca    8.4        2023 14:57  Ca    8.0        2023 05:17  Ca    8.2        2023 05:43  Phos  5.3       2023 05:43  Phos  2.1       2023 18:42  Mg     2.7       2023 05:43  Mg     1.9       2023 18:42    TPro  6.8    /  Alb  3.4    /  TBili  0.8    /  DBili  x      /  AST  208    /  ALT  47     /  AlkPhos  52     2023 11:47      ===============================  ===============================  CARDIAC BIOMARKERS:  BNP      TROPONIN  Troponin I, High Sensitivity Result: 08649.74 ng/L *H* (23 @ 05:17)  Troponin I, High Sensitivity Result: 25916.14 ng/L *H* (23 @ 05:43)  Troponin I, High Sensitivity Result: 537089.35 Serial measurements of high sensitivity troponin I (hs TnI) showing rapid  upward or downward changes suggest acute myocardial injury.  Please note  that a sustained elevation of hsTnI can be caused by renal disease,  chronic heart failure,sepsis, pulmonary embolism and other clinical  conditions.  High Sensitivity Troponin and New Male & Female Reference ranges in  effect as of 10/05/2021.     Female reference range < 53.7 ng/L     Male reference range <78.5 ng/L ng/L *H* (23 @ 18:42)  Troponin I, High Sensitivity Result: 10862.66 ng/L *H* (23 @ 11:47)    ===============================  ===============================  BLOOD CULTURES:    Blood Culture:      @ 05:43  TSH: 0.34    ===============================  ===============================  EKG:  -ECG : ST elevations II, III, aVF V4-V6  -ECG :     TELE: 140s aflutter onset 1a , terminated later this AM.    ===============================  < from: TTE Echo Complete w/o Contrast w/ Doppler (23 @ 13:57) >     Impression     Summary     Normal left ventricular size with mild systolic dysfunction; the   inferoseptum and inferior wall appear hypokinetic. Estimated left   ventricular ejection fraction is 50%.   Mild diastolic dysfunction (stage I).   Aortic valve sclerosis.     ----------------------------------------------------------------   Electronically signed by Bernardo Guadalupe MD(Interpreting   physician) on 2023 02:20 PM   ----------------------------------------------------------------      ===============================  CARDIAC CATH:      Study Date:     2023   Name:           JAZZMINE WELDON   :            1935   (88 years)   Gender:         male   MR#:            [id number]   [facility]#:           [id number]   Patient Class:  Inpatient     Cath Lab Report    Diagnostic Cardiologist:       Arabella Sinha MD   Interventional Cardiologist:   Arabella Sinha MD   Referring Physician:           Bernardo Guadalupe MD     Procedures Performed   Procedures:               1.    Arterial Access - Right Femoral     2.    Diagnostic Coronary Angiography   3.    PCI: LUDMILA     Indications:                Myocardial infarction without ST elevation  (NSTEMI)  Lab Visit Indication:      ACS greater than 24 hours   PCI Status:                urgent     Conclusions:   NSTEMI with diffuse disease in all three vessels     99% RPDA culprit for presentation   Successful PTCA/LUDMILA of RPDA   Recommendations:     ASA/Plavix   CCU admission   Will follow clinically regarding the other stenotic lesions     Procedure Narrative:   ......................................    Coronary Angiography   Left Coronary System:   A catheter was.  Diagnostic Findings:     Coronary Angiography   The coronary circulation is right dominant.    ......  LM   Left main artery: Angiography shows minor irregularities.      LAD   Left anterior descending artery: Angiography shows severe  atherosclerosis. 1  ***********There is a 75 % stenosis in the middle third portion  of the segment.      CX   Circumflex: Angiography shows severe atherosclerosis.  *********** There is a 70 %  stenosis in the middle third portion of the segment.    RCA   Right coronary artery: The vessel was visualized. The segment is  average to large size, moderately calcified and mildly  tortuous. Angiography shows severe atherosclerosis. There is a 99 % De  Tete stenosis after branch to RPL1. Lesion length is  12 mm. MARIO Flow 2. This is an ACC/AHA Non-High/Non C lesion for  intervention.      Electronically signed by Arabella Sinha MD on 2023 at 12:40 PM   =====================================  =====================================  Gallo Urbina M.D.  Cardiology, [facility]  Cell: [phone]  Offices:   [phone] ([facility] Office)  [phone] ([facility] Office)

## 2023-11-13 NOTE — SBIRT NOTE ADULT - NSSBIRTUNABLESCROTHER_GEN_A_CORE
NO SBIRT SCORE; no alcohol use, no use of medications/drugs outside of a MD prescribing.  Inappropriate consult

## 2023-11-13 NOTE — PROGRESS NOTE ADULT - SUBJECTIVE AND OBJECTIVE BOX
88M with pmhx hld, CKD2, recent near syncopal event, brought to the ED for dyspepsia and chest/L shoulder pain. ECG revealing of REGINALD in II, III, AVF, V4-6 and Q qaves. Trops >35K. He was admitted for treatment of acute STEMI. He is now s/p PCI with LUDMILA x1 to RPDA.   had brief episode afib post op, now back in nsr  troponin peaked at [id number], now decreasing  no further chest pain  echo ef 50%, inferior hypokinesis   11/13  Case discussed on CCU rounds, clinically stable, renal function creat has increased to 1.9.  Status discussed with family.      ICU Vital Signs Last 24 Hrs  T(C): 36.8 (13 Nov 2023 13:27), Max: 37.3 (13 Nov 2023 04:29)  T(F): 98.3 (13 Nov 2023 13:27), Max: 99.1 (13 Nov 2023 04:29)  HR: 71 (13 Nov 2023 13:00) (71 - 141)  BP: 100/61 (13 Nov 2023 13:00) (72/45 - 116/65)  BP(mean): 74 (13 Nov 2023 13:00) (55 - 83)  RR: 15 (13 Nov 2023 13:00) (0 - 25)  SpO2: 97% (13 Nov 2023 09:00) (92% - 97%)                                13.3   13.14 )-----------( 218      ( 13 Nov 2023 05:17 )             40.3         11-13    138  |  108  |  33<H>  ----------------------------<  103<H>  4.1   |  27  |  1.93<H>    Ca    8.0<L>      13 Nov 2023 05:17  Phos  5.3     11-12  Mg     2.7     11-12

## 2023-11-14 ENCOUNTER — TRANSCRIPTION ENCOUNTER (OUTPATIENT)
Age: 88
End: 2023-11-14

## 2023-11-14 VITALS — HEART RATE: 64 BPM | DIASTOLIC BLOOD PRESSURE: 68 MMHG | SYSTOLIC BLOOD PRESSURE: 110 MMHG

## 2023-11-14 DIAGNOSIS — I21.3 ST ELEVATION (STEMI) MYOCARDIAL INFARCTION OF UNSPECIFIED SITE: ICD-10-CM

## 2023-11-14 PROBLEM — I10 ESSENTIAL (PRIMARY) HYPERTENSION: Chronic | Status: ACTIVE | Noted: 2023-11-11

## 2023-11-14 PROBLEM — E78.00 PURE HYPERCHOLESTEROLEMIA, UNSPECIFIED: Chronic | Status: ACTIVE | Noted: 2023-11-11

## 2023-11-14 PROBLEM — N18.2 CHRONIC KIDNEY DISEASE, STAGE 2 (MILD): Chronic | Status: ACTIVE | Noted: 2023-11-11

## 2023-11-14 LAB
ADD ON TEST-SPECIMEN IN LAB: SIGNIFICANT CHANGE UP
ADD ON TEST-SPECIMEN IN LAB: SIGNIFICANT CHANGE UP
ANION GAP SERPL CALC-SCNC: 6 MMOL/L — SIGNIFICANT CHANGE UP (ref 5–17)
ANION GAP SERPL CALC-SCNC: 6 MMOL/L — SIGNIFICANT CHANGE UP (ref 5–17)
BUN SERPL-MCNC: 29 MG/DL — HIGH (ref 7–23)
BUN SERPL-MCNC: 29 MG/DL — HIGH (ref 7–23)
CALCIUM SERPL-MCNC: 8.1 MG/DL — LOW (ref 8.5–10.1)
CALCIUM SERPL-MCNC: 8.1 MG/DL — LOW (ref 8.5–10.1)
CHLORIDE SERPL-SCNC: 106 MMOL/L — SIGNIFICANT CHANGE UP (ref 96–108)
CHLORIDE SERPL-SCNC: 106 MMOL/L — SIGNIFICANT CHANGE UP (ref 96–108)
CO2 SERPL-SCNC: 24 MMOL/L — SIGNIFICANT CHANGE UP (ref 22–31)
CO2 SERPL-SCNC: 24 MMOL/L — SIGNIFICANT CHANGE UP (ref 22–31)
CREAT SERPL-MCNC: 1.77 MG/DL — HIGH (ref 0.5–1.3)
CREAT SERPL-MCNC: 1.77 MG/DL — HIGH (ref 0.5–1.3)
EGFR: 37 ML/MIN/1.73M2 — LOW
EGFR: 37 ML/MIN/1.73M2 — LOW
GLUCOSE SERPL-MCNC: 84 MG/DL — SIGNIFICANT CHANGE UP (ref 70–99)
GLUCOSE SERPL-MCNC: 84 MG/DL — SIGNIFICANT CHANGE UP (ref 70–99)
HCT VFR BLD CALC: 38 % — LOW (ref 39–50)
HCT VFR BLD CALC: 38 % — LOW (ref 39–50)
HGB BLD-MCNC: 12.5 G/DL — LOW (ref 13–17)
HGB BLD-MCNC: 12.5 G/DL — LOW (ref 13–17)
MCHC RBC-ENTMCNC: 30.8 PG — SIGNIFICANT CHANGE UP (ref 27–34)
MCHC RBC-ENTMCNC: 30.8 PG — SIGNIFICANT CHANGE UP (ref 27–34)
MCHC RBC-ENTMCNC: 32.9 GM/DL — SIGNIFICANT CHANGE UP (ref 32–36)
MCHC RBC-ENTMCNC: 32.9 GM/DL — SIGNIFICANT CHANGE UP (ref 32–36)
MCV RBC AUTO: 93.6 FL — SIGNIFICANT CHANGE UP (ref 80–100)
MCV RBC AUTO: 93.6 FL — SIGNIFICANT CHANGE UP (ref 80–100)
PLATELET # BLD AUTO: 220 K/UL — SIGNIFICANT CHANGE UP (ref 150–400)
PLATELET # BLD AUTO: 220 K/UL — SIGNIFICANT CHANGE UP (ref 150–400)
POTASSIUM SERPL-MCNC: 4.2 MMOL/L — SIGNIFICANT CHANGE UP (ref 3.5–5.3)
POTASSIUM SERPL-MCNC: 4.2 MMOL/L — SIGNIFICANT CHANGE UP (ref 3.5–5.3)
POTASSIUM SERPL-SCNC: 4.2 MMOL/L — SIGNIFICANT CHANGE UP (ref 3.5–5.3)
POTASSIUM SERPL-SCNC: 4.2 MMOL/L — SIGNIFICANT CHANGE UP (ref 3.5–5.3)
RBC # BLD: 4.06 M/UL — LOW (ref 4.2–5.8)
RBC # BLD: 4.06 M/UL — LOW (ref 4.2–5.8)
RBC # FLD: 13.7 % — SIGNIFICANT CHANGE UP (ref 10.3–14.5)
RBC # FLD: 13.7 % — SIGNIFICANT CHANGE UP (ref 10.3–14.5)
SODIUM SERPL-SCNC: 136 MMOL/L — SIGNIFICANT CHANGE UP (ref 135–145)
SODIUM SERPL-SCNC: 136 MMOL/L — SIGNIFICANT CHANGE UP (ref 135–145)
WBC # BLD: 9 K/UL — SIGNIFICANT CHANGE UP (ref 3.8–10.5)
WBC # BLD: 9 K/UL — SIGNIFICANT CHANGE UP (ref 3.8–10.5)
WBC # FLD AUTO: 9 K/UL — SIGNIFICANT CHANGE UP (ref 3.8–10.5)
WBC # FLD AUTO: 9 K/UL — SIGNIFICANT CHANGE UP (ref 3.8–10.5)

## 2023-11-14 PROCEDURE — 99233 SBSQ HOSP IP/OBS HIGH 50: CPT

## 2023-11-14 PROCEDURE — 99232 SBSQ HOSP IP/OBS MODERATE 35: CPT

## 2023-11-14 PROCEDURE — 99222 1ST HOSP IP/OBS MODERATE 55: CPT

## 2023-11-14 RX ORDER — CLOPIDOGREL BISULFATE 75 MG/1
1 TABLET, FILM COATED ORAL
Qty: 30 | Refills: 9
Start: 2023-11-14 | End: 2024-09-08

## 2023-11-14 RX ORDER — AMLODIPINE BESYLATE 2.5 MG/1
1 TABLET ORAL
Qty: 0 | Refills: 0 | DISCHARGE
Start: 2023-11-14

## 2023-11-14 RX ORDER — APIXABAN 2.5 MG/1
2.5 TABLET, FILM COATED ORAL EVERY 12 HOURS
Refills: 0 | Status: DISCONTINUED | OUTPATIENT
Start: 2023-11-14 | End: 2023-11-14

## 2023-11-14 RX ORDER — FAMOTIDINE 10 MG/ML
20 INJECTION INTRAVENOUS DAILY
Refills: 0 | Status: DISCONTINUED | OUTPATIENT
Start: 2023-11-14 | End: 2023-11-14

## 2023-11-14 RX ORDER — HYDROCHLOROTHIAZIDE 25 MG
1 TABLET ORAL
Refills: 0 | DISCHARGE

## 2023-11-14 RX ORDER — AMLODIPINE BESYLATE 2.5 MG/1
5 TABLET ORAL DAILY
Refills: 0 | Status: DISCONTINUED | OUTPATIENT
Start: 2023-11-14 | End: 2023-11-14

## 2023-11-14 RX ORDER — APIXABAN 2.5 MG/1
1 TABLET, FILM COATED ORAL
Qty: 60 | Refills: 3
Start: 2023-11-14 | End: 2024-03-12

## 2023-11-14 RX ORDER — METOPROLOL TARTRATE 50 MG
1 TABLET ORAL
Qty: 60 | Refills: 3
Start: 2023-11-14 | End: 2024-03-12

## 2023-11-14 RX ORDER — DORZOLAMIDE HYDROCHLORIDE TIMOLOL MALEATE 20; 5 MG/ML; MG/ML
1 SOLUTION/ DROPS OPHTHALMIC
Qty: 0 | Refills: 0 | DISCHARGE
Start: 2023-11-14

## 2023-11-14 RX ORDER — INSULIN HUMAN 100 [IU]/ML
10 INJECTION, SOLUTION SUBCUTANEOUS ONCE
Refills: 0 | Status: DISCONTINUED | OUTPATIENT
Start: 2023-11-14 | End: 2023-11-14

## 2023-11-14 RX ORDER — FAMOTIDINE 10 MG/ML
1 INJECTION INTRAVENOUS
Qty: 30 | Refills: 9
Start: 2023-11-14 | End: 2024-09-08

## 2023-11-14 RX ORDER — AMLODIPINE BESYLATE 2.5 MG/1
1 TABLET ORAL
Refills: 0 | DISCHARGE

## 2023-11-14 RX ORDER — ASPIRIN/CALCIUM CARB/MAGNESIUM 324 MG
1 TABLET ORAL
Qty: 0 | Refills: 0 | DISCHARGE
Start: 2023-11-14

## 2023-11-14 RX ADMIN — CLOPIDOGREL BISULFATE 75 MILLIGRAM(S): 75 TABLET, FILM COATED ORAL at 10:54

## 2023-11-14 RX ADMIN — APIXABAN 2.5 MILLIGRAM(S): 2.5 TABLET, FILM COATED ORAL at 10:54

## 2023-11-14 RX ADMIN — Medication 25 MILLIGRAM(S): at 10:56

## 2023-11-14 RX ADMIN — FAMOTIDINE 20 MILLIGRAM(S): 10 INJECTION INTRAVENOUS at 10:54

## 2023-11-14 RX ADMIN — DORZOLAMIDE HYDROCHLORIDE TIMOLOL MALEATE 1 DROP(S): 20; 5 SOLUTION/ DROPS OPHTHALMIC at 11:02

## 2023-11-14 RX ADMIN — AMLODIPINE BESYLATE 5 MILLIGRAM(S): 2.5 TABLET ORAL at 10:54

## 2023-11-14 RX ADMIN — BRIMONIDINE TARTRATE, TIMOLOL MALEATE 1 DROP(S): 2; 5 SOLUTION/ DROPS OPHTHALMIC at 11:03

## 2023-11-14 RX ADMIN — Medication 81 MILLIGRAM(S): at 10:56

## 2023-11-14 NOTE — DISCHARGE NOTE PROVIDER - NSDCMRMEDTOKEN_GEN_ALL_CORE_FT
amLODIPine 10 mg oral tablet: 1 tab(s) orally once a day  Combigan 0.2%-0.5% ophthalmic solution: 1 drop(s) in each affected eye 2 times a day  Crestor 20 mg oral tablet: 1 tab(s) orally once a day  folic acid 1 mg oral tablet: 1 tab(s) orally once a day  hydroCHLOROthiazide 25 mg oral tablet: 1 tab(s) orally once a day  latanoprost 0.005% ophthalmic solution: 1 drop(s) in each eye once a day (at bedtime)   amLODIPine 10 mg oral tablet: 1 tab(s) orally once a day  apixaban 2.5 mg oral tablet: 1 tab(s) orally every 12 hours  aspirin 81 mg oral tablet, chewable: 1 tab(s) orally once a day  clopidogrel 75 mg oral tablet: 1 tab(s) orally once a day  Combigan 0.2%-0.5% ophthalmic solution: 1 drop(s) in each affected eye 2 times a day  Crestor 20 mg oral tablet: 1 tab(s) orally once a day  dorzolamide-timolol 2.23%-0.68% (2%-0.5% base) preservative-free ophthalmic solution: 1 drop(s) to each affected eye 2 times a day  famotidine 20 mg oral tablet: 1 tab(s) orally once a day  folic acid 1 mg oral tablet: 1 tab(s) orally once a day  hydroCHLOROthiazide 25 mg oral tablet: 1 tab(s) orally once a day  latanoprost 0.005% ophthalmic solution: 1 drop(s) in each eye once a day (at bedtime)  metoprolol tartrate 25 mg oral tablet: 1 tab(s) orally 2 times a day   amLODIPine 10 mg oral tablet: 1 tab(s) orally once a day  apixaban 2.5 mg oral tablet: 1 tab(s) orally every 12 hours  aspirin 81 mg oral tablet, chewable: 1 tab(s) orally once a day for 30 days (1 month)  clopidogrel 75 mg oral tablet: 1 tab(s) orally once a day  Combigan 0.2%-0.5% ophthalmic solution: 1 drop(s) in each affected eye 2 times a day  Crestor 20 mg oral tablet: 1 tab(s) orally once a day  dorzolamide-timolol 2.23%-0.68% (2%-0.5% base) preservative-free ophthalmic solution: 1 drop(s) to each affected eye 2 times a day  famotidine 20 mg oral tablet: 1 tab(s) orally once a day  folic acid 1 mg oral tablet: 1 tab(s) orally once a day  hydroCHLOROthiazide 25 mg oral tablet: 1 tab(s) orally once a day  latanoprost 0.005% ophthalmic solution: 1 drop(s) in each eye once a day (at bedtime)  metoprolol tartrate 25 mg oral tablet: 1 tab(s) orally 2 times a day   amLODIPine 5 mg oral tablet: 1 tab(s) orally once a day  apixaban 2.5 mg oral tablet: 1 tab(s) orally every 12 hours  aspirin 81 mg oral tablet, chewable: 1 tab(s) orally once a day for 30 days (1 month)  clopidogrel 75 mg oral tablet: 1 tab(s) orally once a day  Combigan 0.2%-0.5% ophthalmic solution: 1 drop(s) in each affected eye 2 times a day  Crestor 20 mg oral tablet: 1 tab(s) orally once a day  dorzolamide-timolol 2.23%-0.68% (2%-0.5% base) preservative-free ophthalmic solution: 1 drop(s) to each affected eye 2 times a day  famotidine 20 mg oral tablet: 1 tab(s) orally once a day  folic acid 1 mg oral tablet: 1 tab(s) orally once a day  latanoprost 0.005% ophthalmic solution: 1 drop(s) in each eye once a day (at bedtime)  metoprolol tartrate 25 mg oral tablet: 1 tab(s) orally 2 times a day

## 2023-11-14 NOTE — PROGRESS NOTE ADULT - PROBLEM SELECTOR PLAN 2
-had a 2nd episode of afib, onset overnight terminated this AM.    -Cont. BB for rate control  -CHADS2-VASc=5 (>75, HTN, DM, CAD) - hold off on eliquis  in case cath needed prior to d/c.
Brief episode overnight. Will monitor on tele and hold off on starting triple therapy unless recurs/persists
-had a 2nd episode of afib, onset overnight terminated this AM.    -Cont. BB for rate control  -CHADS2-VASc=5 (>75, HTN, DM, CAD) -   will give eliquis 2.5 mg po BID , continue ecotrin , plavix for now ( triple therapy )    increas elopressor dose , decrease norvasc to 5 mg po daily ,  follow up as OP
monitor

## 2023-11-14 NOTE — DISCHARGE NOTE NURSING/CASE MANAGEMENT/SOCIAL WORK - PATIENT PORTAL LINK FT
You can access the FollowMyHealth Patient Portal offered by [facility] by registering at the following website: [email or web address]. By joining [employer]’s FollowMyHealth portal, you will also be able to view your health information using other applications (apps) compatible with our system.

## 2023-11-14 NOTE — DISCHARGE NOTE PROVIDER - NSDCFUSCHEDAPPT_GEN_ALL_CORE_FT
[facility]  CARDIOLOGY [address]  Scheduled Appointment: 11/28/2023    Alta Yang  [facility]  CARDIOLOGY [address]  Scheduled Appointment: 12/13/2023     [facility]  CARDIOLOGY [address]  Scheduled Appointment: 11/28/2023    Rob Zazueta  [facility]  CARDIOLOGY [address]  Scheduled Appointment: 11/29/2023    Alta Yang  [facility]  CARDIOLOGY [address]  Scheduled Appointment: 12/13/2023

## 2023-11-14 NOTE — DISCHARGE NOTE PROVIDER - ATTENDING DISCHARGE PHYSICAL EXAMINATION:
PHYSICAL EXAM:   GEN: Age appropriate, resting comfortably in bed, no acute distress, non toxic appearing, speaking in complete sentences.   HEENT: Conjunctiva and sclera normal  PULM: Lungs CTAB, no wheezes, rales, rhonchi  CV: RRR, S1S2, no MRG  MSK: no stiffness or joint effusions  Abdominal: Soft, nontender to palpation, non-distended, +BS  Extremities: No edema or cyanosis  NEURO: AAOx3  Psych: normal affect, normal behavior  Skin: No rashes, lesions    T(C): 36.4 (11-14-23 @ 05:21), Max: 36.9 (11-13-23 @ 21:24)  HR: 64 (11-14-23 @ 13:00) (61 - 101)  BP: 110/68 (11-14-23 @ 13:00) (94/61 - 128/70)  RR: 19 (11-14-23 @ 07:00) (9 - 20)  SpO2: 97% (11-13-23 @ 17:09) (97% - 97%)

## 2023-11-14 NOTE — DISCHARGE NOTE NURSING/CASE MANAGEMENT/SOCIAL WORK - NSDCPEFALRISK_GEN_ALL_CORE
For information on Fall & Injury Prevention, visit: [email or web address] OR  [email or web address] OR  https://www.cdc.gov/steadi/patient.html

## 2023-11-14 NOTE — CONSULT NOTE ADULT - CONVERSATION DETAILS
The role of Palliative medicine was reviewed with the pt. He discussed his recent adm and is prepared to be disch home today with F/U in 2 weeks with Cardiology. He states that his wife is his HCP and that he would not want long term measures to sustain life if " he was in a vegetative state". He would agree to a trial of CPR an Intubation at this time. No limits set

## 2023-11-14 NOTE — PROGRESS NOTE ADULT - SUBJECTIVE AND OBJECTIVE BOX
CHIEF COMPLAINT: chest pain     HPI: Mr. Weldon is an 87 yo male with a hx hypertension, CKD, dyslipidemia, recent COVID infection 2023 presenting with left sided chest disomfort x1 day. Pt follows with Dr. Guadalupe- was seen last by NP  for presyncope. He had an episode of dizziness, nausea/vomiting while out. Felt improved so declined EMS transfer to ED. He was ordered for MCOT, echocardiogram from  which showed normal LV function and no significant valvular disease. MCOT still in place (battery  per pt). He has not had recent exertional angina/dyspnea. Was exercising on a treadmill 2 days ago without symptoms. Yesterday, he experienced nausea and fatigue. Today he reports left sided discomfort with radiation to left shoulder.     In the ED, pt is hemodynamically stable saturating well on room air.   Labs notable for Hs trop 35,909, , Cr 1.54, WBC 14.   ECG shows NSR with RBBB, inferolateral REGINALD with Q waves     Per ED, interventional cardiologist was consulted who recommended against emergent catheterization due to infarct pattern.      : s/p PCI to PDA on . Pt had brief episode of Afib overnight on telemetry   : reported atypical L shoulder discomfort different than symptoms on presentation.  resolved after 10-15 minutes. tele - afib w/ RVR at 1a then terminated late this AM. pt asymptomatic during episode.  23 Patient is feeling fine , hard of hearing , no chest pain , wants to go home ,  did have episodes of AFIB with rapid rate ,  lwo normal range SBP , renal function worsening     MEDICATIONS:  OUTPATIENT  Home Medications:  amLODIPine 10 mg oral tablet: 1 tab(s) orally once a day (2023 13:53)  Combigan 0.2%-0.5% ophthalmic solution: 1 drop(s) in each affected eye 2 times a day (2023 15:00)  Crestor 20 mg oral tablet: 1 tab(s) orally once a day (2023 13:52)  folic acid 1 mg oral tablet: 1 tab(s) orally once a day (2023 13:54)  hydroCHLOROthiazide 25 mg oral tablet: 1 tab(s) orally once a day (2023 15:01)  latanoprost 0.005% ophthalmic solution: 1 drop(s) in each eye once a day (at bedtime) (2023 15:02)      MEDICATIONS  (STANDING):  amLODIPine   Tablet 10 milliGRAM(s) Oral daily  aspirin  chewable 81 milliGRAM(s) Oral daily  atorvastatin 80 milliGRAM(s) Oral at bedtime  brimonidine 0.2%/ timolol 0.5% Ophthalmic Solution 1 Drop(s) Both EYES every 12 hours  clopidogrel Tablet 75 milliGRAM(s) Oral daily  dorzolamide 2%/timolol 0.5% Ophthalmic Solution 1 Drop(s) Both EYES two times a day  influenza  Vaccine (HIGH DOSE) 0.7 milliLiter(s) IntraMuscular once  latanoprost 0.005% Ophthalmic Solution 1 Drop(s) Both EYES at bedtime  metoprolol tartrate 25 milliGRAM(s) Oral two times a day    MEDICATIONS  (PRN):  acetaminophen     Tablet .. 650 milliGRAM(s) Oral every 6 hours PRN Mild Pain (1 - 3)  morphine  - Injectable 2 milliGRAM(s) IV Push every 3 hours PRN Moderate Pain (4 - 6)    Vital Signs Last 24 Hrs  T(C): 36.4 (2023 05:21), Max: 36.9 (2023 21:24)  T(F): 97.6 (2023 05:21), Max: 98.4 (2023 21:24)  HR: 65 (2023 05:21) (61 - 123)  BP: 115/64 (2023 05:21) (88/76 - 123/68)  BP(mean): 79 (2023 05:21) (70 - 95)  RR: 20 (2023 05:21) (9 - 22)  SpO2: 97% (2023 17:09) (97% - 97%)    Parameters below as of 2023 17:09  Patient On (Oxygen Delivery Method): room air        I&O's Summary    2023 07:01  -  2023 07:00  --------------------------------------------------------  IN: 900 mL / OUT: 1400 mL / NET: -500 mL        PHYSICAL EXAM:  Constitutional: NAD, drowsy   HEENT:  EOMI,  Pupils round, No oral cyanosis.  Pulmonary: Non-labored, breath sounds are clear bilaterally, No wheezing, rales or rhonchi  Cardiovascular: S1 and S2, regular rate and rhythm, no Murmurs, gallops or rubs  Gastrointestinal: Bowel Sounds present, soft, nontender.   Lymph: No peripheral edema. No cervical lymphadenopathy.  Neurological: Alert, no focal deficits  Skin: No rashes.  Psych:  Mood & affect appropriate        ===============================  ===============================  LABS:    LDL Cholesterol Calculated: 38: Interpretive Comment:                            12.5   9.00  )-----------( 220      ( 2023 07:02 )             38.0         136  |  106  |  29<H>  ----------------------------<  84  4.2   |  24  |  1.77<H>    Ca    8.1<L>      2023 07:02              Urinalysis Basic - ( 2023 07:02 )    Color: x / Appearance: x / SG: x / pH: x  Gluc: 84 mg/dL / Ketone: x  / Bili: x / Urobili: x   Blood: x / Protein: x / Nitrite: x   Leuk Esterase: x / RBC: x / WBC x   Sq Epi: x / Non Sq Epi: x / Bacteria: x      11-12 Chol 113 LDL -- HDL 60 Trig 80            Urinalysis Basic - ( 2023 07:02 )    Color: x / Appearance: x / SG: x / pH: x  Gluc: 84 mg/dL / Ketone: x  / Bili: x / Urobili: x   Blood: x / Protein: x / Nitrite: x   Leuk Esterase: x / RBC: x / WBC x   Sq Epi: x / Non Sq Epi: x / Bacteria: x      11-12 Chol 113 LDL -- HDL 60 Trig 80    ===============================  ===============================  EKG:  -ECG : ST elevations II, III, aVF V4-V6  -ECG :     TELE: 140s aflutter onset 1a , terminated later this AM.    ===============================  < from: TTE Echo Complete w/o Contrast w/ Doppler (23 @ 13:57) >     Impression     Summary     Normal left ventricular size with mild systolic dysfunction; the   inferoseptum and inferior wall appear hypokinetic. Estimated left   ventricular ejection fraction is 50%.   Mild diastolic dysfunction (stage I).   Aortic valve sclerosis.     ----------------------------------------------------------------   Electronically signed by Bernardo Guadalupe MD(Interpreting   physician) on 2023 02:20 PM   ----------------------------------------------------------------      ===============================  CARDIAC CATH:      Study Date:     2023   Name:           JAZZMINE WELDON   :            1935   (88 years)   Gender:         male   MR#:            [id number]   [facility]#:           [id number]   Patient Class:  Inpatient     Cath Lab Report    Diagnostic Cardiologist:       Arabella Sinha MD   Interventional Cardiologist:   Arabella Sinha MD   Referring Physician:           Bernardo Guadalupe MD     Procedures Performed   Procedures:               1.    Arterial Access - Right Femoral     2.    Diagnostic Coronary Angiography   3.    PCI: LUDMILA     Indications:                Myocardial infarction without ST elevation  (NSTEMI)  Lab Visit Indication:      ACS greater than 24 hours   PCI Status:                urgent     Conclusions:   NSTEMI with diffuse disease in all three vessels     99% RPDA culprit for presentation   Successful PTCA/LUDMILA of RPDA   Recommendations:     ASA/Plavix   CCU admission   Will follow clinically regarding the other stenotic lesions     Procedure Narrative:   ......................................    Coronary Angiography   Left Coronary System:   A catheter was.  Diagnostic Findings:     Coronary Angiography   The coronary circulation is right dominant.    ......  LM   Left main artery: Angiography shows minor irregularities.      LAD   Left anterior descending artery: Angiography shows severe  atherosclerosis. 1  ***********There is a 75 % stenosis in the middle third portion  of the segment.      CX   Circumflex: Angiography shows severe atherosclerosis.  *********** There is a 70 %  stenosis in the middle third portion of the segment.    RCA   Right coronary artery: The vessel was visualized. The segment is  average to large size, moderately calcified and mildly  tortuous. Angiography shows severe atherosclerosis. There is a 99 % De  Tete stenosis after branch to RPL1. Lesion length is  12 mm. MARIO Flow 2. This is an ACC/AHA Non-High/Non C lesion for  intervention.      Electronically signed by Arabella Sinha MD on 2023 at 12:40 PM   =====================================  =====================================

## 2023-11-14 NOTE — PROGRESS NOTE ADULT - PROBLEM SELECTOR PLAN 1
-Pt has acute chest and left shoulder pain x1 day.   -ECG with inferolateral REGINALD and q waves. Hs trop 35,000 then peaked at 115K  -Echo w/ EF 50% & inferior & inferosept. HK  -He has no hx CAD. Recent evaluation for presyncope 11/1 with normal echocardiogram. MCOT in progress  -s/p cath with Dr. Dr. Sinha on 11/11- diffuse disease, s/p PCI to PDA.yesterday   -Discussed case w/ Dr. Zazueta - pt has mid LAD & mid LCx 70% lesion - PCI recommended electively when Cr improves unless unstable features are present.  could be considered as OP.    -cont aspirin and plavix, atorvastatin 80mg  -Cont. metoprolol tartrate 25 mg BID, tolerating well. transition to once daily succinate on discharge.    decrease norvasc to 5 mg po daily , can increase lopressor 50 mg in AM 25 mg PM

## 2023-11-14 NOTE — CONSULT NOTE ADULT - SUBJECTIVE AND OBJECTIVE BOX
HPI: Pt is a 88y old Male with a PMH of hypertension, CKD, dyslipidemia, recent COVID infection 2023 presenting with left sided chest disomfort x1 day. Pt follows with Dr. Guadalupe- was seen last by NP  for presyncope. He had an episode of dizziness, nausea/vomiting while out. Felt improved so declined EMS transfer to ED. He was ordered for MCOT, echocardiogram from  which showed normal LV function and no significant valvular disease. MCOT still in place (battery  per pt). He has not had recent exertional angina/dyspnea. Was exercising on a treadmill 2 days ago without symptoms. Palliative medicine eval for [facility]  23 Seen at bedside. Pt awaiting disch home with no current complaints    PAIN: ( )Yes   (X )No    DYSPNEA: ( ) Yes  (X ) No  Level:    PAST MEDICAL & SURGICAL HISTORY:  Stage 2 chronic kidney disease  Hypercholesteremia  Mild HTN      SOCIAL HX:  Lives with wife  Hx opiate tolerance ( )YES  ( X)NO    Baseline ADLs  (Prior to Admission)  ( X) Independent   ( )Dependent    FAMILY HISTORY:      Review of Systems:  Dyspnea-denies  Anorexia-denies  Fatigue-denies  Weakness-denies    All other systems reviewed and negative      PHYSICAL EXAM:    Vital Signs Last 24 Hrs  T(C): 36.4 (2023 05:21), Max: 36.9 (2023 21:24)  T(F): 97.6 (2023 05:21), Max: 98.4 (2023 21:24)  HR: 65 (2023 05:21) (61 - 111)  BP: 115/64 (2023 05:21) (88/76 - 123/68)  BP(mean): 79 (2023 05:21) (70 - 95)  RR: 20 (2023 05:21) (9 - 22)  SpO2: 97% (2023 17:09) (97% - 97%)  Parameters below as of 2023 17:09  Patient On (Oxygen Delivery Method): room air      PPSV2: 50-60 %    General: Elderly male in bed in NAD  Mental Status: A&O X3  HEENT: MMM  Lungs: clear sravan  Cardiac: S1S2+  GI: abd soft + BS  : voids  Ext: BELCHER =strength  Neuro: no focal def      LABS:                        12.5   9.00  )-----------( 220      ( 2023 07:02 )             38.0         136  |  106  |  29<H>  ----------------------------<  84  4.2   |  24  |  1.77<H>    Ca    8.1<L>      2023 07:02    Albumin: Albumin: 3.4 g/dL ( @ 11:47)    Allergies    No Known Allergies    Intolerances      MEDICATIONS  (STANDING):  amLODIPine   Tablet 5 milliGRAM(s) Oral daily  apixaban 2.5 milliGRAM(s) Oral every 12 hours  aspirin  chewable 81 milliGRAM(s) Oral daily  atorvastatin 80 milliGRAM(s) Oral at bedtime  brimonidine 0.2%/ timolol 0.5% Ophthalmic Solution 1 Drop(s) Both EYES every 12 hours  clopidogrel Tablet 75 milliGRAM(s) Oral daily  dorzolamide 2%/timolol 0.5% Ophthalmic Solution 1 Drop(s) Both EYES two times a day  famotidine    Tablet 20 milliGRAM(s) Oral daily  influenza  Vaccine (HIGH DOSE) 0.7 milliLiter(s) IntraMuscular once  latanoprost 0.005% Ophthalmic Solution 1 Drop(s) Both EYES at bedtime  metoprolol tartrate 25 milliGRAM(s) Oral two times a day    MEDICATIONS  (PRN):  acetaminophen     Tablet .. 650 milliGRAM(s) Oral every 6 hours PRN Mild Pain (1 - 3)  morphine  - Injectable 2 milliGRAM(s) IV Push every 3 hours PRN Moderate Pain (4 - 6)      RADIOLOGY/ADDITIONAL STUDIES:    < from: Xray Chest 1 View- PORTABLE-Urgent (Xray Chest 1 View- PORTABLE-Urgent .) (23 @ 12:22) >    ACC: [id number] EXAM:  XR CHEST PORTABLE URGENT 1V   ORDERED BY: ISELA JOYCE     PROCEDURE DATE:  2023          INTERPRETATION:  AP chest on 2023 at 12:05 PM. Patient had   failure of heart monitor.    COMPARISON: None available.    Heart magnified by technique.    There is a small infiltrate off the right lower hilum and possible   minimal retrocardiac infiltrate.    IMPRESSION: Small bibasilar infiltrates right greater than left.    < end of copied text >

## 2023-11-14 NOTE — DISCHARGE NOTE PROVIDER - HOSPITAL COURSE
89 yo male with a hx hypertension, CKD, dyslipidemia, recent COVID infection 2023 presenting with left sided chest discomfort x1 day. Pt follows with Dr. Guadalupe- was seen last by NP  for presyncope. He had an episode of dizziness, nausea/vomiting while out. Symptoms  improved so declined EMS transfer to ED. He was ordered for MCOT, echocardiogram from  which showed normal LV function and no significant valvular disease. MCOT still in place (battery  per pt). He has not had recent exertional angina/dyspnea. Was exercising on a treadmill 2 days ago without symptoms. Yesterday, he experienced nausea and fatigue. Today he reports left sided discomfort with radiation to left shoulder.     In the ED, pt is hemodynamically stable saturating well on room air.   Labs notable for Hs trop 35,909, , Cr 1.54, WBC 14.   ECG shows NSR with RBBB, inferolateral REGINALD with Q waves     Per ED, interventional cardiologist was consulted who recommended against emergent catheterization due to infarct pattern.      : s/p PCI to PDA on . Pt had brief episode of Afib overnight on telemetry         Subjective/Observations: Pt. seen and examined and evaluated. Pt. resting comfortably in bed in NAD, with no respiratory distress, no chest pain, dyspnea, palpitations, PND, or orthopnea.    REVIEW OF SYSTEMS: All other review of systems is negative unless indicated above    PAST MEDICAL & SURGICAL HISTORY:  Stage 2 chronic kidney disease      Hypercholesteremia      Mild HTN          MEDICATIONS  (STANDING):  amLODIPine   Tablet 5 milliGRAM(s) Oral daily  apixaban 2.5 milliGRAM(s) Oral every 12 hours  aspirin  chewable 81 milliGRAM(s) Oral daily  atorvastatin 80 milliGRAM(s) Oral at bedtime  brimonidine 0.2%/ timolol 0.5% Ophthalmic Solution 1 Drop(s) Both EYES every 12 hours  clopidogrel Tablet 75 milliGRAM(s) Oral daily  dorzolamide 2%/timolol 0.5% Ophthalmic Solution 1 Drop(s) Both EYES two times a day  famotidine    Tablet 20 milliGRAM(s) Oral daily  influenza  Vaccine (HIGH DOSE) 0.7 milliLiter(s) IntraMuscular once  latanoprost 0.005% Ophthalmic Solution 1 Drop(s) Both EYES at bedtime  metoprolol tartrate 25 milliGRAM(s) Oral two times a day    MEDICATIONS  (PRN):  acetaminophen     Tablet .. 650 milliGRAM(s) Oral every 6 hours PRN Mild Pain (1 - 3)  morphine  - Injectable 2 milliGRAM(s) IV Push every 3 hours PRN Moderate Pain (4 - 6)      Allergies    No Known Allergies    Intolerances          Vital Signs Last 24 Hrs  T(C): 36.4 (2023 05:21), Max: 36.9 (2023 21:24)  T(F): 97.6 (2023 05:21), Max: 98.4 (2023 21:24)  HR: 65 (:21) (61 - 123)  BP: 115/64 (2023 05:21) (88/76 - 123/68)  BP(mean): 79 (:21) (70 - 95)  RR: 20 (:21) (9 - 22)  SpO2: 97% (2023 17:09) (97% - 97%)    Parameters below as of 2023 17:09  Patient On (Oxygen Delivery Method): room air        I&O's Summary    2023 07:01  -  2023 07:00  --------------------------------------------------------  IN: 900 mL / OUT: 1400 mL / NET: -500 mL              LABS: All Labs Reviewed:                        12.5   9.00  )-----------( 220      ( 2023 07:02 )             38.0                         13.3   13.14 )-----------( 218      ( 2023 05:17 )             40.3                         14.4   14.71 )-----------( 232      ( 2023 05:43 )             43.5     2023 07:02    136    |  106    |  29 89 yo male with a hx hypertension, CKD, dyslipidemia, recent COVID infection 2023 presenting with left sided chest discomfort x1 day. Pt follows with Dr. Guadalupe- was seen last by NP  for presyncope. He had an episode of dizziness, nausea/vomiting while out. Symptoms  improved so declined EMS transfer to ED. He was ordered for MCOT, echocardiogram from  which showed normal LV function and no significant valvular disease. MCOT still in place (battery  per pt). He has not had recent exertional angina/dyspnea. Was exercising on a treadmill 2 days ago without symptoms. Yesterday, he experienced nausea and fatigue. Today he reports left sided discomfort with radiation to left shoulder.   In the ED, pt was hemodynamically stable saturating well on room air.   Labs notable for Hs trop 35,909, , Cr 1.54, WBC 14.   ECG shows NSR with RBBB, inferolateral REGINALD with Q waves     : s/p PCI to PDA on . Pt had brief episode of Afib overnight on telemetry   Subjective/Observations: Pt. seen and examined and evaluated. Pt. resting comfortably in bed in NAD, with no respiratory distress, no chest pain, dyspnea, palpitations, PND, or orthopnea.    REVIEW OF SYSTEMS: All other review of systems is negative unless indicated above    PAST MEDICAL & SURGICAL HISTORY:  Stage 2 chronic kidney disease  Hypercholesteremia  Mild HTN    MEDICATIONS  (STANDING):  amLODIPine   Tablet 5 milliGRAM(s) Oral daily  apixaban 2.5 milliGRAM(s) Oral every 12 hours  aspirin  chewable 81 milliGRAM(s) Oral daily  atorvastatin 80 milliGRAM(s) Oral at bedtime  brimonidine 0.2%/ timolol 0.5% Ophthalmic Solution 1 Drop(s) Both EYES every 12 hours  clopidogrel Tablet 75 milliGRAM(s) Oral daily  dorzolamide 2%/timolol 0.5% Ophthalmic Solution 1 Drop(s) Both EYES two times a day  famotidine    Tablet 20 milliGRAM(s) Oral daily  influenza  Vaccine (HIGH DOSE) 0.7 milliLiter(s) IntraMuscular once  latanoprost 0.005% Ophthalmic Solution 1 Drop(s) Both EYES at bedtime  metoprolol tartrate 25 milliGRAM(s) Oral two times a day    MEDICATIONS  (PRN):  acetaminophen     Tablet .. 650 milliGRAM(s) Oral every 6 hours PRN Mild Pain (1 - 3)  morphine  - Injectable 2 milliGRAM(s) IV Push every 3 hours PRN Moderate Pain (4 - 6)      Allergies: No Known Allergies      I&O's Summary    2023 07:01  -  2023 07:00  --------------------------------------------------------  IN: 900 mL / OUT: 1400 mL / NET: -500 mL       LABS: All Labs Reviewed:                        12.5   9.00  )-----------( 220      ( 2023 07:02 )             38.0             11-14    136  |  106  |  29<H>  ----------------------------<  84  4.2   |  24  |  1.77<H>    Ca    8.1<L>      2023 07:02    Echo:  < from: TTE Echo Complete w/o Contrast w/ Doppler (23 @ 13:57) >   Impression   Summary   Normal left ventricular size with mild systolic dysfunction; the   inferoseptum and inferior wall appear hypokinetic. Estimated left   ventricular ejection fraction is 50%.   Mild diastolic dysfunction (stage I).   Aortic valve sclerosis    Cath:  < from: Cardiac Catheterization (23 @ 15:17) >    Conclusions:   NSTEMI with diffuse disease in all three vessels   99% RPDA culprit for presentation   Successful PTCA/LUDMILA of RPDA  Will follow clinically regarding the other stenotic lesions     EKG:     Interpretation of Telemetry:      Physical Exam:  Appearance: [ ] Normal  [ ] abnormal [X ] NAD   Eyes: [ ] PERRL [ ] EOMI  HEENT: [ ] Normal [ ] Abnormal oral mucosa [ ]NC/AT  Cardiovascular: [X ] S1 [X ] S2 [ ] RRR [ ] m/r/g [ ]edema [ ] JVP  Procedural Access Site: [X  hematoma [ ] tender to palpation [ ] 2+ pulse [ ] bruit [ ] Ecchymosis  Respiratory: [ ] Clear to auscultation bilaterally  Gastrointestinal: [ ] Soft [ ] tenderness[ ] distension [ ] BS  Musculoskeletal: [ ] clubbing [ ] joint deformity   Neurologic: [ ] Non-focal  Lymphatic: [ ] lymphadenopathy  Psychiatry: [X ] AAOx3  [ ] confused [ ] disoriented [ ] Mood & affect appropriate  Skin: [ ]  rashes [ ] ecchymoses [ ] cyanosis   89 yo male with a hx hypertension, CKD, dyslipidemia, recent COVID infection 2023 presenting with left sided chest discomfort x1 day. Pt follows with Dr. Guadalupe- was seen last by NP  for presyncope. He had an episode of dizziness, nausea/vomiting while out. Symptoms  improved so declined EMS transfer to ED. He was ordered for MCOT, echocardiogram from  which showed normal LV function and no significant valvular disease. MCOT still in place (battery  per pt). He has not had recent exertional angina/dyspnea. Was exercising on a treadmill 2 days ago without symptoms. Yesterday, he experienced nausea and fatigue. Today he reports left sided discomfort with radiation to left shoulder.   In the ED, pt was hemodynamically stable saturating well on room air.   Labs notable for Hs trop 35,909, , Cr 1.54, WBC 14.   ECG shows NSR with RBBB, inferolateral REGINALD with Q waves     : s/p PCI to PDA on . Pt had brief episode of Afib overnight on telemetry   Subjective/Observations: Pt. seen and examined and evaluated. Pt. resting comfortably in bed in NAD, with no respiratory distress, no chest pain, dyspnea, palpitations, PND, or orthopnea.    REVIEW OF SYSTEMS: All other review of systems is negative unless indicated above    PAST MEDICAL & SURGICAL HISTORY:  Stage 2 chronic kidney disease  Hypercholesteremia  Mild HTN    MEDICATIONS  (STANDING):  amLODIPine   Tablet 5 milliGRAM(s) Oral daily  apixaban 2.5 milliGRAM(s) Oral every 12 hours  aspirin  chewable 81 milliGRAM(s) Oral daily  atorvastatin 80 milliGRAM(s) Oral at bedtime  brimonidine 0.2%/ timolol 0.5% Ophthalmic Solution 1 Drop(s) Both EYES every 12 hours  clopidogrel Tablet 75 milliGRAM(s) Oral daily  dorzolamide 2%/timolol 0.5% Ophthalmic Solution 1 Drop(s) Both EYES two times a day  famotidine    Tablet 20 milliGRAM(s) Oral daily  influenza  Vaccine (HIGH DOSE) 0.7 milliLiter(s) IntraMuscular once  latanoprost 0.005% Ophthalmic Solution 1 Drop(s) Both EYES at bedtime  metoprolol tartrate 25 milliGRAM(s) Oral two times a day    MEDICATIONS  (PRN):  acetaminophen     Tablet .. 650 milliGRAM(s) Oral every 6 hours PRN Mild Pain (1 - 3)  morphine  - Injectable 2 milliGRAM(s) IV Push every 3 hours PRN Moderate Pain (4 - 6)      Allergies: No Known Allergies      I&O's Summary    2023 07:01  -  2023 07:00  --------------------------------------------------------  IN: 900 mL / OUT: 1400 mL / NET: -500 mL       LABS: All Labs Reviewed:                        12.5   9.00  )-----------( 220      ( 2023 07:02 )             38.0             11-14    136  |  106  |  29<H>  ----------------------------<  84  4.2   |  24  |  1.77<H>    Ca    8.1<L>      2023 07:02    Echo:  < from: TTE Echo Complete w/o Contrast w/ Doppler (23 @ 13:57) >   Impression   Summary   Normal left ventricular size with mild systolic dysfunction; the   inferoseptum and inferior wall appear hypokinetic. Estimated left   ventricular ejection fraction is 50%.   Mild diastolic dysfunction (stage I).   Aortic valve sclerosis    Cath:  < from: Cardiac Catheterization (23 @ 15:17) >    Conclusions:   NSTEMI with diffuse disease in all three vessels   99% RPDA culprit for presentation   Successful PTCA/LUDMILA of RPDA  Will follow clinically regarding the other stenotic lesions     EKG:     Interpretation of Telemetry:      Physical Exam:  Appearance: [ ] Normal  [ ] abnormal [X ] NAD   Eyes: [ ] PERRL [ ] EOMI  HEENT: [ ] Normal [ ] Abnormal oral mucosa [ ]NC/AT  Cardiovascular: [X ] S1 [X ] S2 [ ] RRR [ ] m/r/g [ ]edema [ ] JVP  Procedural Access Site: [X} rt. groin benign soft no bleeding no hematoma +1PP    Gastrointestinal: [ ] Soft [ ] tenderness[ ] distension [ ] BS  Musculoskeletal: [ ] clubbing [ ] joint deformity   Neurologic: [ ] Non-focal  Lymphatic: [ ] lymphadenopathy  Psychiatry: [X ] AAOx3  [ ] confused [ ] disoriented [ ] Mood & affect appropriate  Skin: [ ]  rashes [ ] ecchymoses [ ] cyanosis   87 yo male with a hx hypertension, CKD, dyslipidemia, recent COVID infection 2023 presenting with left sided chest discomfort x1 day. Pt follows with Dr. Guadalupe- was seen last by NP  for presyncope. He had an episode of dizziness, nausea/vomiting while out. Symptoms  improved so declined EMS transfer to ED. He was ordered for MCOT, echocardiogram from  which showed normal LV function and no significant valvular disease. MCOT still in place (battery  per pt). He has not had recent exertional angina/dyspnea. Was exercising on a treadmill 2 days ago without symptoms. Yesterday, he experienced nausea and fatigue. Today he reports left sided discomfort with radiation to left shoulder.   In the ED, pt was hemodynamically stable saturating well on room air.   Labs notable for Hs trop 35,909, , Cr 1.54, WBC 14.   ECG shows NSR with RBBB, inferolateral REGINALD with Q waves     : s/p PCI to PDA on . Pt had brief episode of Afib overnight on telemetry   Subjective/Observations: Pt. seen and examined and evaluated. Pt. resting comfortably in bed in NAD, with no respiratory distress, no chest pain, dyspnea, palpitations, PND, or orthopnea..    REVIEW OF SYSTEMS: All other review of systems is negative unless indicated above    PAST MEDICAL & SURGICAL HISTORY:  Stage 2 chronic kidney disease  Hypercholesteremia  Mild HTN    MEDICATIONS  (STANDING):  amLODIPine   Tablet 5 milliGRAM(s) Oral daily  apixaban 2.5 milliGRAM(s) Oral every 12 hours  aspirin  chewable 81 milliGRAM(s) Oral daily  atorvastatin 80 milliGRAM(s) Oral at bedtime  brimonidine 0.2%/ timolol 0.5% Ophthalmic Solution 1 Drop(s) Both EYES every 12 hours  clopidogrel Tablet 75 milliGRAM(s) Oral daily  dorzolamide 2%/timolol 0.5% Ophthalmic Solution 1 Drop(s) Both EYES two times a day  famotidine    Tablet 20 milliGRAM(s) Oral daily  influenza  Vaccine (HIGH DOSE) 0.7 milliLiter(s) IntraMuscular once  latanoprost 0.005% Ophthalmic Solution 1 Drop(s) Both EYES at bedtime  metoprolol tartrate 25 milliGRAM(s) Oral two times a day    MEDICATIONS  (PRN):  acetaminophen     Tablet .. 650 milliGRAM(s) Oral every 6 hours PRN Mild Pain (1 - 3)  morphine  - Injectable 2 milliGRAM(s) IV Push every 3 hours PRN Moderate Pain (4 - 6)      Allergies: No Known Allergies      I&O's Summary    2023 07:01  -  2023 07:00  --------------------------------------------------------  IN: 900 mL / OUT: 1400 mL / NET: -500 mL       LABS: All Labs Reviewed:                        12.5   9.00  )-----------( 220      ( 2023 07:02 )             38.0             11-14    136  |  106  |  29<H>  ----------------------------<  84  4.2   |  24  |  1.77<H>    Ca    8.1<L>      2023 07:02    Echo:  < from: TTE Echo Complete w/o Contrast w/ Doppler (23 @ 13:57) >   Impression   Summary   Normal left ventricular size with mild systolic dysfunction; the   inferoseptum and inferior wall appear hypokinetic. Estimated left   ventricular ejection fraction is 50%.   Mild diastolic dysfunction (stage I).   Aortic valve sclerosis    Cath:  < from: Cardiac Catheterization (23 @ 15:17) >    Conclusions:   NSTEMI with diffuse disease in all three vessels   99% RPDA culprit for presentation   Successful PTCA/LUDMILA of RPDA  Will follow clinically regarding the other stenotic lesions     EKG:     Interpretation of Telemetry:      Physical Exam:  Appearance: [ ] Normal  [ ] abnormal [X ] NAD   Eyes: [ ] PERRL [ ] EOMI  HEENT: [ ] Normal [ ] Abnormal oral mucosa [ ]NC/AT  Cardiovascular: [X ] S1 [X ] S2 [ ] RRR [ ] m/r/g [ ]edema [ ] JVP  Procedural Access Site: [X} rt. groin benign soft no bleeding no hematoma +1PP    Gastrointestinal: [ ] Soft [ ] tenderness[ ] distension [ ] BS  Musculoskeletal: [ ] clubbing [ ] joint deformity   Neurologic: [ ] Non-focal  Lymphatic: [ ] lymphadenopathy  Psychiatry: [X ] AAOx3  [ ] confused [ ] disoriented [ ] Mood & affect appropriate  Skin: [ ]  rashes [ ] ecchymoses [ ] cyanosis.

## 2023-11-14 NOTE — DISCHARGE NOTE PROVIDER - CARE PROVIDER_API CALL
Rob Zazueta  Interventional Cardiology  [address], [address]  [place] [address]  Phone: [phone]  Fax: [phone]  Follow Up Time:

## 2023-11-14 NOTE — CONSULT NOTE ADULT - ASSESSMENT
HPI: Pt is a 88y old Male with a PMH of hypertension, CKD, dyslipidemia, recent COVID infection 2023 presenting with left sided chest disomfort x1 day. Pt follows with Dr. Guadalupe- was seen last by NP  for presyncope. He had an episode of dizziness, nausea/vomiting while out. Felt improved so declined EMS transfer to ED. He was ordered for MCOT, echocardiogram from  which showed normal LV function and no significant valvular disease. MCOT still in place (battery  per pt). He has not had recent exertional angina/dyspnea. Was exercising on a treadmill 2 days ago without symptoms. Palliative medicine eval for GOC  23 Seen at bedside. Pt awaiting disch home with no current complaints    Assessment and Plan:    1) ST elevation MI (STEMI).   -Acute chest and left shoulder pain resolved   -ECG with inferolateral REGINALD and q waves  -Trop 35,000.  -Cardio eval  -No urgent cath  -Will follow as outpt    2) Advanced Directives  -Pt with capacity  -Wife Georgette HCP  -No copy on chart  -GOC done  -No limits set    Time Spent: 45 minutes including the care, coordination and counseling of this patient, 50% of which was spent coordinating and counseling.

## 2023-11-15 ENCOUNTER — TRANSCRIPTION ENCOUNTER (OUTPATIENT)
Age: 88
End: 2023-11-15

## 2023-11-15 RX ORDER — AMLODIPINE BESYLATE 2.5 MG/1
1 TABLET ORAL
Qty: 30 | Refills: 3
Start: 2023-11-15 | End: 2024-03-13

## 2023-11-16 ENCOUNTER — NON-APPOINTMENT (OUTPATIENT)
Age: 88
End: 2023-11-16

## 2023-11-17 ENCOUNTER — APPOINTMENT (OUTPATIENT)
Dept: CARE COORDINATION | Facility: HOME HEALTH | Age: 88
End: 2023-11-17
Payer: MEDICARE

## 2023-11-17 VITALS
WEIGHT: 145.44 LBS | BODY MASS INDEX: 23.47 KG/M2 | HEART RATE: 72 BPM | DIASTOLIC BLOOD PRESSURE: 58 MMHG | SYSTOLIC BLOOD PRESSURE: 112 MMHG | OXYGEN SATURATION: 94 % | RESPIRATION RATE: 14 BRPM

## 2023-11-17 PROCEDURE — 99495 TRANSJ CARE MGMT MOD F2F 14D: CPT

## 2023-11-17 RX ORDER — ROSUVASTATIN CALCIUM 20 MG/1
20 TABLET, FILM COATED ORAL DAILY
Refills: 0 | Status: ACTIVE | COMMUNITY
Start: 2021-10-28

## 2023-11-17 RX ORDER — DORZOLAMIDE HYDROCHLORIDE TIMOLOL MALEATE 20; 5 MG/ML; MG/ML
22.3-6.8 SOLUTION/ DROPS OPHTHALMIC
Refills: 0 | Status: ACTIVE | COMMUNITY
Start: 2022-02-24

## 2023-11-17 RX ORDER — FOLIC ACID 1 MG/1
1 TABLET ORAL
Qty: 90 | Refills: 0 | Status: DISCONTINUED | COMMUNITY
Start: 2022-01-31 | End: 2023-11-17

## 2023-11-17 RX ORDER — FAMOTIDINE 20 MG/1
20 TABLET, FILM COATED ORAL DAILY
Refills: 0 | Status: ACTIVE | COMMUNITY
Start: 2023-11-17

## 2023-11-17 RX ORDER — LATANOPROST/PF 0.005 %
0.01 DROPS OPHTHALMIC (EYE)
Refills: 0 | Status: ACTIVE | COMMUNITY
Start: 2022-06-06

## 2023-11-17 RX ORDER — CLOPIDOGREL BISULFATE 75 MG/1
75 TABLET, FILM COATED ORAL DAILY
Refills: 0 | Status: ACTIVE | COMMUNITY
Start: 2023-11-17

## 2023-11-17 RX ORDER — FOLIC ACID 1 MG/1
1 TABLET ORAL DAILY
Qty: 30 | Refills: 0 | Status: ACTIVE | COMMUNITY
Start: 2023-11-17 | End: 1900-01-01

## 2023-11-17 RX ORDER — BRIMONIDINE TARTRATE, TIMOLOL MALEATE 2; 5 MG/ML; MG/ML
0.2-0.5 SOLUTION/ DROPS OPHTHALMIC
Refills: 0 | Status: ACTIVE | COMMUNITY
Start: 2022-06-06

## 2023-11-17 RX ORDER — UBIDECARENONE/VIT E ACET 100MG-5
1000 CAPSULE ORAL
Refills: 0 | Status: DISCONTINUED | COMMUNITY
End: 2023-11-17

## 2023-11-18 DIAGNOSIS — I25.10 ATHEROSCLEROTIC HEART DISEASE OF NATIVE CORONARY ARTERY WITHOUT ANGINA PECTORIS: ICD-10-CM

## 2023-11-18 DIAGNOSIS — E87.6 HYPOKALEMIA: ICD-10-CM

## 2023-11-18 DIAGNOSIS — I12.9 HYPERTENSIVE CHRONIC KIDNEY DISEASE WITH STAGE 1 THROUGH STAGE 4 CHRONIC KIDNEY DISEASE, OR UNSPECIFIED CHRONIC KIDNEY DISEASE: ICD-10-CM

## 2023-11-18 DIAGNOSIS — E83.39 OTHER DISORDERS OF PHOSPHORUS METABOLISM: ICD-10-CM

## 2023-11-18 DIAGNOSIS — I21.3 ST ELEVATION (STEMI) MYOCARDIAL INFARCTION OF UNSPECIFIED SITE: ICD-10-CM

## 2023-11-18 DIAGNOSIS — Z87.891 PERSONAL HISTORY OF NICOTINE DEPENDENCE: ICD-10-CM

## 2023-11-18 DIAGNOSIS — Z86.16 PERSONAL HISTORY OF COVID-19: ICD-10-CM

## 2023-11-18 DIAGNOSIS — I97.89 OTHER POSTPROCEDURAL COMPLICATIONS AND DISORDERS OF THE CIRCULATORY SYSTEM, NOT ELSEWHERE CLASSIFIED: ICD-10-CM

## 2023-11-18 DIAGNOSIS — E83.42 HYPOMAGNESEMIA: ICD-10-CM

## 2023-11-18 DIAGNOSIS — I45.10 UNSPECIFIED RIGHT BUNDLE-BRANCH BLOCK: ICD-10-CM

## 2023-11-18 DIAGNOSIS — I25.84 CORONARY ATHEROSCLEROSIS DUE TO CALCIFIED CORONARY LESION: ICD-10-CM

## 2023-11-18 DIAGNOSIS — E78.00 PURE HYPERCHOLESTEROLEMIA, UNSPECIFIED: ICD-10-CM

## 2023-11-18 DIAGNOSIS — I48.0 PAROXYSMAL ATRIAL FIBRILLATION: ICD-10-CM

## 2023-11-18 DIAGNOSIS — N18.2 CHRONIC KIDNEY DISEASE, STAGE 2 (MILD): ICD-10-CM

## 2023-11-21 ENCOUNTER — TRANSCRIPTION ENCOUNTER (OUTPATIENT)
Age: 88
End: 2023-11-21

## 2023-11-28 ENCOUNTER — APPOINTMENT (OUTPATIENT)
Dept: CARDIOLOGY | Facility: CLINIC | Age: 88
End: 2023-11-28
Payer: MEDICARE

## 2023-11-28 PROCEDURE — 93880 EXTRACRANIAL BILAT STUDY: CPT

## 2023-11-29 ENCOUNTER — APPOINTMENT (OUTPATIENT)
Dept: CARDIOLOGY | Facility: CLINIC | Age: 88
End: 2023-11-29

## 2023-11-29 ENCOUNTER — NON-APPOINTMENT (OUTPATIENT)
Age: 88
End: 2023-11-29

## 2023-11-29 ENCOUNTER — APPOINTMENT (OUTPATIENT)
Dept: CARDIOLOGY | Facility: CLINIC | Age: 88
End: 2023-11-29
Payer: MEDICARE

## 2023-11-29 VITALS
DIASTOLIC BLOOD PRESSURE: 64 MMHG | BODY MASS INDEX: 24.43 KG/M2 | SYSTOLIC BLOOD PRESSURE: 128 MMHG | HEIGHT: 66 IN | WEIGHT: 152 LBS

## 2023-11-29 VITALS — DIASTOLIC BLOOD PRESSURE: 60 MMHG | SYSTOLIC BLOOD PRESSURE: 124 MMHG

## 2023-11-29 VITALS
HEIGHT: 66 IN | SYSTOLIC BLOOD PRESSURE: 140 MMHG | OXYGEN SATURATION: 99 % | WEIGHT: 152 LBS | DIASTOLIC BLOOD PRESSURE: 70 MMHG | HEART RATE: 57 BPM | BODY MASS INDEX: 24.43 KG/M2

## 2023-11-29 PROCEDURE — 99214 OFFICE O/P EST MOD 30 MIN: CPT

## 2023-11-29 PROCEDURE — 99205 OFFICE O/P NEW HI 60 MIN: CPT

## 2023-11-29 PROCEDURE — 93000 ELECTROCARDIOGRAM COMPLETE: CPT

## 2023-11-29 RX ORDER — SILDENAFIL 50 MG/1
50 TABLET ORAL
Qty: 20 | Refills: 0 | Status: DISCONTINUED | COMMUNITY
Start: 2022-03-19 | End: 2023-11-29

## 2023-11-30 ENCOUNTER — TRANSCRIPTION ENCOUNTER (OUTPATIENT)
Age: 88
End: 2023-11-30

## 2023-12-04 ENCOUNTER — NON-APPOINTMENT (OUTPATIENT)
Age: 88
End: 2023-12-04

## 2023-12-06 PROCEDURE — 93228 REMOTE 30 DAY ECG REV/REPORT: CPT

## 2023-12-07 ENCOUNTER — TRANSCRIPTION ENCOUNTER (OUTPATIENT)
Age: 88
End: 2023-12-07

## 2023-12-13 ENCOUNTER — APPOINTMENT (OUTPATIENT)
Dept: CARDIOLOGY | Facility: CLINIC | Age: 88
End: 2023-12-13

## 2023-12-14 ENCOUNTER — APPOINTMENT (OUTPATIENT)
Dept: CARDIOLOGY | Facility: CLINIC | Age: 88
End: 2023-12-14
Payer: MEDICARE

## 2023-12-14 ENCOUNTER — NON-APPOINTMENT (OUTPATIENT)
Age: 88
End: 2023-12-14

## 2023-12-14 VITALS
WEIGHT: 149 LBS | OXYGEN SATURATION: 100 % | HEART RATE: 55 BPM | BODY MASS INDEX: 23.95 KG/M2 | DIASTOLIC BLOOD PRESSURE: 60 MMHG | SYSTOLIC BLOOD PRESSURE: 118 MMHG | HEIGHT: 66 IN

## 2023-12-14 PROCEDURE — 93000 ELECTROCARDIOGRAM COMPLETE: CPT

## 2023-12-14 PROCEDURE — 99215 OFFICE O/P EST HI 40 MIN: CPT

## 2023-12-27 ENCOUNTER — APPOINTMENT (OUTPATIENT)
Dept: CARDIOLOGY | Facility: CLINIC | Age: 88
End: 2023-12-27
Payer: MEDICARE

## 2023-12-27 ENCOUNTER — NON-APPOINTMENT (OUTPATIENT)
Age: 88
End: 2023-12-27

## 2023-12-27 VITALS
SYSTOLIC BLOOD PRESSURE: 116 MMHG | WEIGHT: 154 LBS | DIASTOLIC BLOOD PRESSURE: 66 MMHG | HEIGHT: 66 IN | OXYGEN SATURATION: 100 % | BODY MASS INDEX: 24.75 KG/M2 | HEART RATE: 53 BPM

## 2023-12-27 VITALS
WEIGHT: 154 LBS | SYSTOLIC BLOOD PRESSURE: 116 MMHG | HEART RATE: 53 BPM | BODY MASS INDEX: 24.75 KG/M2 | DIASTOLIC BLOOD PRESSURE: 66 MMHG | RESPIRATION RATE: 14 BRPM | OXYGEN SATURATION: 100 % | HEIGHT: 66 IN

## 2023-12-27 DIAGNOSIS — R94.31 ABNORMAL ELECTROCARDIOGRAM [ECG] [EKG]: ICD-10-CM

## 2023-12-27 DIAGNOSIS — I25.2 OLD MYOCARDIAL INFARCTION: ICD-10-CM

## 2023-12-27 DIAGNOSIS — I10 ESSENTIAL (PRIMARY) HYPERTENSION: ICD-10-CM

## 2023-12-27 PROCEDURE — 99214 OFFICE O/P EST MOD 30 MIN: CPT

## 2023-12-27 PROCEDURE — 93000 ELECTROCARDIOGRAM COMPLETE: CPT

## 2023-12-27 RX ORDER — ASPIRIN ENTERIC COATED TABLETS 81 MG 81 MG/1
81 TABLET, DELAYED RELEASE ORAL DAILY
Refills: 0 | Status: DISCONTINUED | COMMUNITY
Start: 2023-11-17 | End: 2023-12-27

## 2023-12-27 NOTE — PHYSICAL EXAM
[Normal S1, S2] : normal S1, S2 [Clear Lung Fields] : clear lung fields [Normal Gait] : normal gait [No Edema] : no edema [Alert and Oriented] : alert and oriented [de-identified] : Appears his stated age and well [de-identified] : Extraocular muscles are intact [de-identified] : Mild bradycardia [de-identified] : Skin is warm and dry

## 2023-12-27 NOTE — CARDIOLOGY SUMMARY
[de-identified] : 12/27/2023. Sinus Bradycardia with first degree A-V block.  Right bundle branch block with left axis -bifascicular block.  Inferior infarct -age undetermined.  T-abnormality -Anterolateral and inferior ischemia. [de-identified] : 11/11/2023.  Normal left ventricular size with hypokinesis of the inferoseptum and inferior wall; LVEF 50%.  Mild diastolic dysfunction.  Aortic valve sclerosis. [de-identified] : 11/11/2023.  LAD: 70% mid stenosis.  Left circumflex: 70% mid stenosis.  99% stenosis in the RPDA (treated with drug-eluting stent).

## 2023-12-27 NOTE — HISTORY OF PRESENT ILLNESS
[FreeTextEntry1] : Leonard Vasquez is an 88-year-old man with a history of hypertension, chronic kidney disease, hyperlipidemia, and abnormal ECG (first-degree AV block and right bundle branch block), who turns for cardiac examination.  Since he establish care with me in mid July 2023 he was hospitalized with a myocardial infarction and underwent PCI of a severe stenosis in the RPDA (11/11/23); Brief PAF during hospitalization.  He has sought several consultations regarding his non-revascularized LAD stenosis--ultimately, he decided to pursue a nuclear stress test at [facility] (scheduled for early January) with a decision regarding PCI made based on those results.  However, he has been feeling well and is exercising (walking) with no angina.  He offers no new complaints today other than feeling fatigued--especially in the morning and feels the need for a mid-day nap.

## 2023-12-27 NOTE — DISCUSSION/SUMMARY
[FreeTextEntry1] :  Coronary artery disease: Recent infarction and with 70% non-evascularized LAD stenosis; no angina; stress test ordered by another cardiologist--will review results with patient when test has been completed.  He is presently doing well and active with no angina; continue clopidogrel, metoprolol (decreased dose to 12.5 mg twice daily given bradycardia and new fatigue), and rosuvastatin.  Abnormal ECG: Markedly abnormal--consistent with recent ACS.  Hypertension: Controlled.  Right bundle branch block: Chronic  Atrial fibrillation: Paroxysmal and first diagnosed during hospitalization in November; currently in sinus rhythm; continue Eliquis given high [id number] score.

## 2024-02-07 ENCOUNTER — APPOINTMENT (OUTPATIENT)
Dept: CARDIOLOGY | Facility: CLINIC | Age: 89
End: 2024-02-07

## 2024-02-07 RX ORDER — AMLODIPINE BESYLATE 5 MG/1
5 TABLET ORAL DAILY
Qty: 30 | Refills: 3 | Status: ACTIVE | COMMUNITY
Start: 2022-01-31 | End: 1900-01-01

## 2024-02-08 ENCOUNTER — NON-APPOINTMENT (OUTPATIENT)
Age: 89
End: 2024-02-08

## 2024-02-08 ENCOUNTER — APPOINTMENT (OUTPATIENT)
Dept: CARDIOLOGY | Facility: CLINIC | Age: 89
End: 2024-02-08
Payer: MEDICARE

## 2024-02-08 VITALS
DIASTOLIC BLOOD PRESSURE: 73 MMHG | BODY MASS INDEX: 24.11 KG/M2 | WEIGHT: 150 LBS | HEART RATE: 59 BPM | HEIGHT: 66 IN | OXYGEN SATURATION: 98 % | SYSTOLIC BLOOD PRESSURE: 158 MMHG

## 2024-02-08 DIAGNOSIS — N18.30 CHRONIC KIDNEY DISEASE, STAGE 3 UNSPECIFIED: ICD-10-CM

## 2024-02-08 DIAGNOSIS — E78.5 HYPERLIPIDEMIA, UNSPECIFIED: ICD-10-CM

## 2024-02-08 DIAGNOSIS — I25.10 ATHEROSCLEROTIC HEART DISEASE OF NATIVE CORONARY ARTERY W/OUT ANGINA PECTORIS: ICD-10-CM

## 2024-02-08 DIAGNOSIS — Z01.810 ENCOUNTER FOR PREPROCEDURAL CARDIOVASCULAR EXAMINATION: ICD-10-CM

## 2024-02-08 DIAGNOSIS — Z79.01 LONG TERM (CURRENT) USE OF ANTICOAGULANTS: ICD-10-CM

## 2024-02-08 DIAGNOSIS — I48.0 PAROXYSMAL ATRIAL FIBRILLATION: ICD-10-CM

## 2024-02-08 DIAGNOSIS — I45.10 UNSPECIFIED RIGHT BUNDLE-BRANCH BLOCK: ICD-10-CM

## 2024-02-08 PROCEDURE — 99215 OFFICE O/P EST HI 40 MIN: CPT

## 2024-02-08 PROCEDURE — 93000 ELECTROCARDIOGRAM COMPLETE: CPT | Mod: NC

## 2024-02-08 PROCEDURE — G2211 COMPLEX E/M VISIT ADD ON: CPT

## 2024-02-08 NOTE — CARDIOLOGY SUMMARY
[de-identified] : 1/9/2024 exercise stress echo - 7 minutes and 34 seconds (> 8 METS), 89% MPHR, normal augmentation of systolic function [de-identified] : 1/9/2024 - borderline low normal LV systolic function, LVEF 50-55% [de-identified] : 11/13/2023. [facility]. LM  Left main artery: Angiography shows minor irregularities.   Left anterior descending artery: Angiography shows severe atherosclerosis. There is a 75 % stenosis in the middle third portion of the segment.   Circumflex: Angiography shows severe atherosclerosis. There is a 70 % stenosis in the middle third portion of the segment. Right coronary artery: The vessel was visualized. The segment is average to large size, moderately calcified and mildly tortuous. Angiography shows severe atherosclerosis. There is a 99 % DeNovo stenosis after branch to RPL1. Lesion length is 12 mm. MARIO Flow 2. This is an ACC/AHA Non-High/Non C lesion for intervention.

## 2024-02-08 NOTE — REASON FOR VISIT
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 Harinder Reynolds MD at [facility] [phone], fax [phone], attention, Lu Vasquez. Patient is maintained on Eliquis 2.5 mg BID and clopidogrel 75 mg daily. He no longer takes ASA. Currently, he has a small amount of bleeding after his second scalp biopsy. He has no cardiovascular complaints. Stress testing one month ago showed exercise capacity > 8 METS.

## 2024-02-08 NOTE — HISTORY OF PRESENT ILLNESS
[FreeTextEntry1] : Leonard Vasquez presents today to establish care.  He is an 88-year-old with recent PTCA with LUDMILA to RPDA who is feeling generally well.  [place]19[place]20[place] Before the dinner he felt suddenly as if he were going to collapse. He was escorted from the room and EMS was called. He was driven back home that night.  Then on 11/10/2023 he felt nauseated at home and went to lie down. The next morning he awoke with left shoulder pain and fatigue. He went to [facility] where he was found to have positive troponins and send urgently to the cath lab.  During his angiogram a stent was placed to the RPDA, but he was also found to have residual disease of the middle third of the LAD (75%) and the mid circumflex (70%). He is now seeking a second opinion as he is conflicted on the next appropriate course of action.  Dr. Zazueta had suggested staged PCI x 2 to the residual lesions. He was also seen at [facility] in [place] and is now pending a follow up nuclear stress test in January.   He recently had a bad case of Covid with 6-week of residual cough. His voice remains scratchy. For these symptoms he was ENT and was started on Pepcid.

## 2024-02-08 NOTE — DISCUSSION/SUMMARY
[EKG obtained to assist in diagnosis and management of assessed problem(s)] : EKG obtained to assist in diagnosis and management of assessed problem(s) [FreeTextEntry1] : He is an 88-year-old with history as above who presents today for preop cardiovascular evaluation prior to melanoma excision requiring anesthesia.  Cardiac catheterization was films have been personally reviewed showing stable LAD disease, although not well visualized.  Stress echo performed 1/9/2024 with good exercise normal augmentation of systolic function.  Continue Eliquis 2.5 mg BID and metoprolol for patient with history of paroxysmal atrial fibrillation age > 80 years, weight > 60 kg, creatinine > 1.5 mg/dL.  He is scheduled for an excision of melanoma from his scalp on 2/20/2024 with Harinder Reynolds MD at [facility]. Continue high intensity statin therapy as well.  Patient is capable of > 4 METS. He is without acute coronary syndrome, decompensated heart failure, dangerous arrhythmia, or critical valvular stenosis.  No further cardiovascular testing is necessary prior to proceeding with his planned procedure. Continue beta-blocker perioperatively. He will hold Eliquis for two days prior to surgery. He can resume anticoagulation when all bleeding has stopped. He will continue antiplatelet monotherapy without interruption. If he needs to transition from clopidogrel to ASA, he will take  mg PO x1 and then 81 mg daily thereafter.

## 2024-02-12 ENCOUNTER — RX RENEWAL (OUTPATIENT)
Age: 89
End: 2024-02-12

## 2024-02-22 NOTE — PATIENT PROFILE ADULT - NSPROPTRIGHTSUPPORTPERSON_GEN_A_NUR
Rx Refill Note  Requested Prescriptions     Pending Prescriptions Disp Refills    lisdexamfetamine (Vyvanse) 50 MG capsule 30 capsule 0     Sig: Take 1 capsule by mouth Every Morning      Last office visit with prescribing clinician: 2/5/2024   Last telemedicine visit with prescribing clinician: Visit date not found   Next office visit with prescribing clinician: 5/6/2024                         Would you like a call back once the refill request has been completed: [] Yes [] No    If the office needs to give you a call back, can they leave a voicemail: [] Yes [] No    Katherin Ribeiro MA  02/22/24, 17:18 EST   yes

## 2024-05-07 ENCOUNTER — RX RENEWAL (OUTPATIENT)
Age: 89
End: 2024-05-07

## 2024-05-24 RX ORDER — METOPROLOL TARTRATE 25 MG/1
25 TABLET, FILM COATED ORAL
Qty: 90 | Refills: 0 | Status: ACTIVE | COMMUNITY
Start: 2023-11-17 | End: 1900-01-01

## 2024-06-05 ENCOUNTER — RX RENEWAL (OUTPATIENT)
Age: 89
End: 2024-06-05

## 2024-06-05 RX ORDER — APIXABAN 2.5 MG/1
2.5 TABLET, FILM COATED ORAL
Qty: 180 | Refills: 0 | Status: ACTIVE | COMMUNITY
Start: 2023-11-17 | End: 1900-01-01

## 2024-06-17 NOTE — REASON FOR VISIT
Comment: lesion is better, not painful. Slowly improving. She will continue warm compresses. If worsens again, I recommended she see an ophthalmologist. Render Risk Assessment In Note?: no Detail Level: Zone [Annual Wellness Visit] : an annual wellness visit [FreeTextEntry1] : Pt is here for a physical.

## 2024-07-24 ENCOUNTER — APPOINTMENT (OUTPATIENT)
Dept: NEPHROLOGY | Facility: CLINIC | Age: 89
End: 2024-07-24
Payer: MEDICARE

## 2024-07-24 VITALS — HEART RATE: 60 BPM | DIASTOLIC BLOOD PRESSURE: 52 MMHG | RESPIRATION RATE: 12 BRPM | SYSTOLIC BLOOD PRESSURE: 112 MMHG

## 2024-07-24 PROCEDURE — G2211 COMPLEX E/M VISIT ADD ON: CPT

## 2024-07-24 PROCEDURE — 99215 OFFICE O/P EST HI 40 MIN: CPT

## 2024-07-24 NOTE — PHYSICAL EXAM
[General Appearance - Alert] : alert [General Appearance - In No Acute Distress] : in no acute distress [Sclera] : the sclera and conjunctiva were normal [PERRL With Normal Accommodation] : pupils were equal in size, round, and reactive to light [Extraocular Movements] : extraocular movements were intact [Outer Ear] : the ears and nose were normal in appearance [Oropharynx] : the oropharynx was normal [FreeTextEntry1] : Hard of hearing, has hearing aides [Neck Appearance] : the appearance of the neck was normal [Neck Cervical Mass (___cm)] : no neck mass was observed [Jugular Venous Distention Increased] : there was no jugular-venous distention [Respiration, Rhythm And Depth] : normal respiratory rhythm and effort [Exaggerated Use Of Accessory Muscles For Inspiration] : no accessory muscle use [Auscultation Breath Sounds / Voice Sounds] : lungs were clear to auscultation bilaterally [Heart Rate And Rhythm] : heart rate was normal and rhythm regular [Heart Sounds] : normal S1 and S2 [Heart Sounds Gallop] : no gallops [Murmurs] : no murmurs [Heart Sounds Pericardial Friction Rub] : no pericardial rub [Edema] : there was no peripheral edema [Veins - Varicosity Changes] : there were no varicosital changes [Abdomen Soft] : soft [Bowel Sounds] : normal bowel sounds [Abdomen Tenderness] : non-tender [Abdomen Mass (___ Cm)] : no abdominal mass palpated [No CVA Tenderness] : no ~M costovertebral angle tenderness [No Spinal Tenderness] : no spinal tenderness [Abnormal Walk] : normal gait [Involuntary Movements] : no involuntary movements were seen [Skin Color & Pigmentation] : normal skin color and pigmentation [Skin Turgor] : normal skin turgor [] : no rash [Cranial Nerves] : cranial nerves 2-12 were intact [No Focal Deficits] : no focal deficits [Affect] : the affect was normal [Mood] : the mood was normal

## 2024-07-24 NOTE — ASSESSMENT
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 Otherwise asymptomatic, monitoring closely  Fluctuations likely from fluid status and microvascular disease  HTN - Controlled in office today, if CKD still progressing may pursue ABPM to ensure good control and not dropping too low. Continue to monitor closely, discussed orthostatic symptoms and to contact if happens  RTC in 6-12 months if sCr stable today

## 2024-07-24 NOTE — HISTORY OF PRESENT ILLNESS
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 Discussed last lab results in detail from may, slight rise in sCr 1.82->1.64->1.77->1.94, still close to usual baseline. Labs from this AM have not resulted yet, will call when they are recieved.   Discussed meds: talking half metoprolol BID, otherwise as recd. Will trial stopping pepcid to see if intermittent diarrhea resolves.   Does not want any new medications for prostate/urinary fz for now if can avoid it, discussed if he needs it will reach out.

## 2024-07-25 LAB
ALBUMIN SERPL ELPH-MCNC: 4.2 G/DL
ANION GAP SERPL CALC-SCNC: 11 MMOL/L
BUN SERPL-MCNC: 28 MG/DL
CALCIUM SERPL-MCNC: 9.3 MG/DL
CALCIUM SERPL-MCNC: 9.3 MG/DL
CHLORIDE SERPL-SCNC: 105 MMOL/L
CO2 SERPL-SCNC: 25 MMOL/L
CREAT SERPL-MCNC: 1.93 MG/DL
CREAT SPEC-SCNC: 137 MG/DL
CYSTATIN C SERPL-MCNC: 1.56 MG/L
EGFR: 33 ML/MIN/1.73M2
GFR/BSA.PRED SERPLBLD CYS-BASED-ARV: 39 ML/MIN/1.73M2
GLUCOSE SERPL-MCNC: 93 MG/DL
MICROALBUMIN 24H UR DL<=1MG/L-MCNC: <1.2 MG/DL
MICROALBUMIN/CREAT 24H UR-RTO: NORMAL MG/G
PARATHYROID HORMONE INTACT: 56 PG/ML
PHOSPHATE SERPL-MCNC: 3.3 MG/DL
POTASSIUM SERPL-SCNC: 4.7 MMOL/L
SODIUM SERPL-SCNC: 141 MMOL/L

## 2024-07-29 ENCOUNTER — RX RENEWAL (OUTPATIENT)
Age: 89
End: 2024-07-29

## 2024-07-29 ENCOUNTER — APPOINTMENT (OUTPATIENT)
Dept: INTERNAL MEDICINE | Facility: CLINIC | Age: 89
End: 2024-07-29

## 2024-07-29 VITALS
BODY MASS INDEX: 24.27 KG/M2 | DIASTOLIC BLOOD PRESSURE: 78 MMHG | HEIGHT: 66 IN | WEIGHT: 151 LBS | TEMPERATURE: 97.8 F | HEART RATE: 57 BPM | OXYGEN SATURATION: 98 % | SYSTOLIC BLOOD PRESSURE: 124 MMHG

## 2024-07-29 DIAGNOSIS — R97.20 ELEVATED PROSTATE, SPECIFIC ANTIGEN [PSA]: ICD-10-CM

## 2024-07-29 DIAGNOSIS — Z87.19 PERSONAL HISTORY OF OTHER DISEASES OF THE DIGESTIVE SYSTEM: ICD-10-CM

## 2024-07-29 DIAGNOSIS — I10 ESSENTIAL (PRIMARY) HYPERTENSION: ICD-10-CM

## 2024-07-29 DIAGNOSIS — R74.8 ABNORMAL LEVELS OF OTHER SERUM ENZYMES: ICD-10-CM

## 2024-07-29 DIAGNOSIS — N18.30 CHRONIC KIDNEY DISEASE, STAGE 3 UNSPECIFIED: ICD-10-CM

## 2024-07-29 DIAGNOSIS — H40.9 UNSPECIFIED GLAUCOMA: ICD-10-CM

## 2024-07-29 DIAGNOSIS — Z79.01 LONG TERM (CURRENT) USE OF ANTICOAGULANTS: ICD-10-CM

## 2024-07-29 DIAGNOSIS — I25.10 ATHEROSCLEROTIC HEART DISEASE OF NATIVE CORONARY ARTERY W/OUT ANGINA PECTORIS: ICD-10-CM

## 2024-07-29 DIAGNOSIS — D03.9 MELANOMA IN SITU, UNSPECIFIED: ICD-10-CM

## 2024-07-29 DIAGNOSIS — Z86.39 PERSONAL HISTORY OF OTHER ENDOCRINE, NUTRITIONAL AND METABOLIC DISEASE: ICD-10-CM

## 2024-07-29 DIAGNOSIS — E78.5 HYPERLIPIDEMIA, UNSPECIFIED: ICD-10-CM

## 2024-07-29 DIAGNOSIS — R19.5 OTHER FECAL ABNORMALITIES: ICD-10-CM

## 2024-07-29 DIAGNOSIS — Z87.09 PERSONAL HISTORY OF OTHER DISEASES OF THE RESPIRATORY SYSTEM: ICD-10-CM

## 2024-07-29 DIAGNOSIS — R94.31 ABNORMAL ELECTROCARDIOGRAM [ECG] [EKG]: ICD-10-CM

## 2024-07-29 DIAGNOSIS — Z23 ENCOUNTER FOR IMMUNIZATION: ICD-10-CM

## 2024-07-29 DIAGNOSIS — I45.10 UNSPECIFIED RIGHT BUNDLE-BRANCH BLOCK: ICD-10-CM

## 2024-07-29 DIAGNOSIS — I21.9 ACUTE MYOCARDIAL INFARCTION, UNSPECIFIED: ICD-10-CM

## 2024-07-29 DIAGNOSIS — E04.1 NONTOXIC SINGLE THYROID NODULE: ICD-10-CM

## 2024-07-29 DIAGNOSIS — Z00.00 ENCOUNTER FOR GENERAL ADULT MEDICAL EXAMINATION W/OUT ABNORMAL FINDINGS: ICD-10-CM

## 2024-07-29 DIAGNOSIS — Z01.810 ENCOUNTER FOR PREPROCEDURAL CARDIOVASCULAR EXAMINATION: ICD-10-CM

## 2024-07-29 DIAGNOSIS — Z87.448 PERSONAL HISTORY OF OTHER DISEASES OF URINARY SYSTEM: ICD-10-CM

## 2024-07-29 DIAGNOSIS — K63.5 POLYP OF COLON: ICD-10-CM

## 2024-07-29 DIAGNOSIS — R55 SYNCOPE AND COLLAPSE: ICD-10-CM

## 2024-07-29 DIAGNOSIS — Z87.898 PERSONAL HISTORY OF OTHER SPECIFIED CONDITIONS: ICD-10-CM

## 2024-07-29 DIAGNOSIS — I48.0 PAROXYSMAL ATRIAL FIBRILLATION: ICD-10-CM

## 2024-07-29 DIAGNOSIS — T14.8XXA OTHER INJURY OF UNSPECIFIED BODY REGION, INITIAL ENCOUNTER: ICD-10-CM

## 2024-07-29 LAB
DATE COLLECTED: NORMAL
HEMOCCULT SP1 STL QL: POSITIVE
QUALITY CONTROL: YES

## 2024-07-29 PROCEDURE — G0009: CPT | Mod: 59

## 2024-07-29 PROCEDURE — 90471 IMMUNIZATION ADMIN: CPT

## 2024-07-29 PROCEDURE — G0439: CPT

## 2024-07-29 PROCEDURE — 82271 OCCULT BLOOD OTHER SOURCES: CPT | Mod: QW

## 2024-07-29 PROCEDURE — 82270 OCCULT BLOOD FECES: CPT

## 2024-07-29 PROCEDURE — 90677 PCV20 VACCINE IM: CPT

## 2024-07-29 PROCEDURE — G0102: CPT

## 2024-07-29 PROCEDURE — 90715 TDAP VACCINE 7 YRS/> IM: CPT | Mod: GY

## 2024-07-29 NOTE — ASSESSMENT
[FreeTextEntry1] : All preventative measures were reviewed with the patient and the patient is due for and agrees to the following as outlined  in the plan  below.  Flu and upadated COVID vaccine recommended in the fall. rsv as well. prevnar 20 and tdap  given today.

## 2024-07-29 NOTE — HEALTH RISK ASSESSMENT
[Yes] : Yes [2 - 3 times a week (3 pts)] : 2 - 3  times a week (3 points) [1 or 2 (0 pts)] : 1 or 2 (0 points) [Never (0 pts)] : Never (0 points) [0] : 2) Feeling down, depressed, or hopeless: Not at all (0) [Never] : Never [Patient reported colonoscopy was normal] : Patient reported colonoscopy was normal [No falls in past year] : Patient reported no falls in the past year [PHQ-2 Negative - No further assessment needed] : PHQ-2 Negative - No further assessment needed [NO] : No [None] : None [With Family] : lives with family [] :  [Fully functional (bathing, dressing, toileting, transferring, walking, feeding)] : Fully functional (bathing, dressing, toileting, transferring, walking, feeding) [Fully functional (using the telephone, shopping, preparing meals, housekeeping, doing laundry, using] : Fully functional and needs no help or supervision to perform IADLs (using the telephone, shopping, preparing meals, housekeeping, doing laundry, using transportation, managing medications and managing finances) [Smoke Detector] : smoke detector [Carbon Monoxide Detector] : carbon monoxide detector [Seat Belt] :  uses seat belt [Sunscreen] : uses sunscreen [With Patient/Caregiver] : , with patient/caregiver [de-identified] : walks on treadmil [de-identified] : reg/    avoidds  GERD related foods. [HRX3Hcfox] : 0 [EyeExamDate] : 07/01/2024 [Change in mental status noted] : No change in mental status noted [Reports changes in hearing] : Reports no changes in hearing [ColonoscopyDate] : 01/01/2017 [AdvancecareDate] : 7/29/24

## 2024-07-29 NOTE — PHYSICAL EXAM
[No Acute Distress] : no acute distress [Well Nourished] : well nourished [Well Developed] : well developed [Well-Appearing] : well-appearing [Normal Sclera/Conjunctiva] : normal sclera/conjunctiva [PERRL] : pupils equal round and reactive to light [EOMI] : extraocular movements intact [Normal Outer Ear/Nose] : the outer ears and nose were normal in appearance [Normal Oropharynx] : the oropharynx was normal [No JVD] : no jugular venous distention [No Lymphadenopathy] : no lymphadenopathy [Supple] : supple [Thyroid Normal, No Nodules] : the thyroid was normal and there were no nodules present [No Respiratory Distress] : no respiratory distress  [No Accessory Muscle Use] : no accessory muscle use [Clear to Auscultation] : lungs were clear to auscultation bilaterally [Normal Rate] : normal rate  [Regular Rhythm] : with a regular rhythm [Normal S1, S2] : normal S1 and S2 [No Murmur] : no murmur heard [No Carotid Bruits] : no carotid bruits [No Abdominal Bruit] : a ~M bruit was not heard ~T in the abdomen [No Varicosities] : no varicosities [Pedal Pulses Present] : the pedal pulses are present [No Edema] : there was no peripheral edema [No Palpable Aorta] : no palpable aorta [No Extremity Clubbing/Cyanosis] : no extremity clubbing/cyanosis [Soft] : abdomen soft [Non Tender] : non-tender [Non-distended] : non-distended [No Masses] : no abdominal mass palpated [No HSM] : no HSM [Normal Bowel Sounds] : normal bowel sounds [Normal Sphincter Tone] : normal sphincter tone [No Mass] : no mass [Stool Occult Blood] : stool positive for occult blood [Anus Abnormality] : the anus and perineum were normal [Rectal Exam - Rectum] : digital rectal exam was normal [Prostate Enlargement] : the prostate was not enlarged [Prostate Tenderness] : the prostate was not tender [No Prostate Nodules] : no prostate nodules [Normal Posterior Cervical Nodes] : no posterior cervical lymphadenopathy [Normal Anterior Cervical Nodes] : no anterior cervical lymphadenopathy [No CVA Tenderness] : no CVA  tenderness [No Spinal Tenderness] : no spinal tenderness [No Joint Swelling] : no joint swelling [Grossly Normal Strength/Tone] : grossly normal strength/tone [No Rash] : no rash [Coordination Grossly Intact] : coordination grossly intact [No Focal Deficits] : no focal deficits [Normal Gait] : normal gait [Deep Tendon Reflexes (DTR)] : deep tendon reflexes were 2+ and symmetric [Normal Affect] : the affect was normal [Normal Insight/Judgement] : insight and judgment were intact

## 2024-07-30 ENCOUNTER — RX RENEWAL (OUTPATIENT)
Age: 89
End: 2024-07-30

## 2024-08-01 LAB
25(OH)D3 SERPL-MCNC: 47.3 NG/ML
ALBUMIN SERPL ELPH-MCNC: 4.3 G/DL
ALP BLD-CCNC: 55 U/L
ALT SERPL-CCNC: 14 U/L
AMYLASE/CREAT SERPL: 168 U/L
ANION GAP SERPL CALC-SCNC: 13 MMOL/L
APPEARANCE: CLEAR
AST SERPL-CCNC: 14 U/L
BACTERIA: NEGATIVE /HPF
BASOPHILS # BLD AUTO: 0.04 K/UL
BASOPHILS NFR BLD AUTO: 0.6 %
BILIRUB DIRECT SERPL-MCNC: 0.1 MG/DL
BILIRUB SERPL-MCNC: 0.4 MG/DL
BILIRUBIN URINE: NEGATIVE
BLOOD URINE: NEGATIVE
BUN SERPL-MCNC: 28 MG/DL
CALCIUM SERPL-MCNC: 9.1 MG/DL
CAST: 0 /LPF
CHLORIDE SERPL-SCNC: 106 MMOL/L
CHOLEST SERPL-MCNC: 139 MG/DL
CO2 SERPL-SCNC: 22 MMOL/L
COLOR: YELLOW
CREAT SERPL-MCNC: 1.85 MG/DL
EGFR: 35 ML/MIN/1.73M2
EOSINOPHIL # BLD AUTO: 0.07 K/UL
EOSINOPHIL NFR BLD AUTO: 1.1 %
EPITHELIAL CELLS: 1 /HPF
ESTIMATED AVERAGE GLUCOSE: 117 MG/DL
FERRITIN SERPL-MCNC: 46 NG/ML
GLUCOSE QUALITATIVE U: NEGATIVE MG/DL
GLUCOSE SERPL-MCNC: 86 MG/DL
HBA1C MFR BLD HPLC: 5.7 %
HCT VFR BLD CALC: 42.7 %
HCV AB SER QL: NONREACTIVE
HCV S/CO RATIO: 0.1 S/CO
HDLC SERPL-MCNC: 53 MG/DL
HGB BLD-MCNC: 13.7 G/DL
IMM GRANULOCYTES NFR BLD AUTO: 0.3 %
IRON SERPL-MCNC: 77 UG/DL
KETONES URINE: NEGATIVE MG/DL
LDLC SERPL CALC-MCNC: 70 MG/DL
LEUKOCYTE ESTERASE URINE: NEGATIVE
LPL SERPL-CCNC: 59 U/L
LYMPHOCYTES # BLD AUTO: 0.83 K/UL
LYMPHOCYTES NFR BLD AUTO: 12.7 %
MAN DIFF?: NORMAL
MCHC RBC-ENTMCNC: 30.4 PG
MCHC RBC-ENTMCNC: 32.1 GM/DL
MCV RBC AUTO: 94.7 FL
MICROSCOPIC-UA: NORMAL
MONOCYTES # BLD AUTO: 0.75 K/UL
MONOCYTES NFR BLD AUTO: 11.4 %
NEUTROPHILS # BLD AUTO: 4.85 K/UL
NEUTROPHILS NFR BLD AUTO: 73.9 %
NITRITE URINE: NEGATIVE
NONHDLC SERPL-MCNC: 86 MG/DL
PH URINE: 5.5
PLATELET # BLD AUTO: 238 K/UL
POTASSIUM SERPL-SCNC: 5 MMOL/L
PROT SERPL-MCNC: 6.2 G/DL
PROTEIN URINE: NEGATIVE MG/DL
PSA SERPL-MCNC: 11.3 NG/ML
RBC # BLD: 4.51 M/UL
RBC # FLD: 13.6 %
RED BLOOD CELLS URINE: 0 /HPF
SODIUM SERPL-SCNC: 141 MMOL/L
SPECIFIC GRAVITY URINE: 1.02
T4 SERPL-MCNC: 6.4 UG/DL
TRIGL SERPL-MCNC: 77 MG/DL
TSH SERPL-ACNC: 0.93 UIU/ML
UROBILINOGEN URINE: 0.2 MG/DL
WBC # FLD AUTO: 6.56 K/UL
WHITE BLOOD CELLS URINE: 0 /HPF

## 2024-08-06 LAB
ALBUMIN SERPL ELPH-MCNC: NORMAL G/DL
ALP BLD-CCNC: NORMAL U/L
ALT SERPL-CCNC: NORMAL U/L
ANION GAP SERPL CALC-SCNC: NORMAL MMOL/L
AST SERPL-CCNC: NORMAL U/L
BASOPHILS # BLD AUTO: NORMAL K/UL
BASOPHILS NFR BLD AUTO: NORMAL %
BILIRUB DIRECT SERPL-MCNC: NORMAL MG/DL
BILIRUB SERPL-MCNC: NORMAL MG/DL
BUN SERPL-MCNC: NORMAL MG/DL
CALCIUM SERPL-MCNC: NORMAL MG/DL
CHLORIDE SERPL-SCNC: NORMAL MMOL/L
CO2 SERPL-SCNC: NORMAL MMOL/L
CREAT SERPL-MCNC: NORMAL MG/DL
EGFR: NORMAL ML/MIN/1.73M2
EOSINOPHIL # BLD AUTO: NORMAL K/UL
EOSINOPHIL NFR BLD AUTO: NORMAL %
ESTIMATED AVERAGE GLUCOSE: NORMAL MG/DL
GLUCOSE SERPL-MCNC: NORMAL MG/DL
HBA1C MFR BLD HPLC: NORMAL
HCT VFR BLD CALC: NORMAL %
HGB BLD-MCNC: NORMAL G/DL
IMM GRANULOCYTES NFR BLD AUTO: NORMAL %
LYMPHOCYTES # BLD AUTO: NORMAL K/UL
LYMPHOCYTES NFR BLD AUTO: NORMAL %
MAN DIFF?: NORMAL
MCHC RBC-ENTMCNC: NORMAL GM/DL
MCHC RBC-ENTMCNC: NORMAL PG
MCV RBC AUTO: NORMAL FL
MONOCYTES # BLD AUTO: NORMAL K/UL
MONOCYTES NFR BLD AUTO: NORMAL %
NEUTROPHILS # BLD AUTO: NORMAL K/UL
NEUTROPHILS NFR BLD AUTO: NORMAL %
PLATELET # BLD AUTO: NORMAL K/UL
POTASSIUM SERPL-SCNC: NORMAL MMOL/L
PROT SERPL-MCNC: NORMAL G/DL
RBC # BLD: NORMAL M/UL
RBC # FLD: NORMAL %
SODIUM SERPL-SCNC: NORMAL MMOL/L
WBC # FLD AUTO: NORMAL K/UL

## 2024-08-12 ENCOUNTER — APPOINTMENT (OUTPATIENT)
Dept: CARDIOLOGY | Facility: CLINIC | Age: 89
End: 2024-08-12

## 2024-08-19 ENCOUNTER — APPOINTMENT (OUTPATIENT)
Dept: CARDIOLOGY | Facility: CLINIC | Age: 89
End: 2024-08-19

## 2024-08-19 ENCOUNTER — NON-APPOINTMENT (OUTPATIENT)
Age: 89
End: 2024-08-19

## 2024-08-19 VITALS
OXYGEN SATURATION: 98 % | DIASTOLIC BLOOD PRESSURE: 70 MMHG | HEART RATE: 60 BPM | WEIGHT: 150 LBS | SYSTOLIC BLOOD PRESSURE: 121 MMHG | BODY MASS INDEX: 24.21 KG/M2

## 2024-08-19 DIAGNOSIS — I21.9 ACUTE MYOCARDIAL INFARCTION, UNSPECIFIED: ICD-10-CM

## 2024-08-19 DIAGNOSIS — I48.0 PAROXYSMAL ATRIAL FIBRILLATION: ICD-10-CM

## 2024-08-19 DIAGNOSIS — I10 ESSENTIAL (PRIMARY) HYPERTENSION: ICD-10-CM

## 2024-08-19 DIAGNOSIS — I25.10 ATHEROSCLEROTIC HEART DISEASE OF NATIVE CORONARY ARTERY W/OUT ANGINA PECTORIS: ICD-10-CM

## 2024-08-19 PROCEDURE — 99214 OFFICE O/P EST MOD 30 MIN: CPT

## 2024-08-19 PROCEDURE — G2211 COMPLEX E/M VISIT ADD ON: CPT

## 2024-08-19 PROCEDURE — 93000 ELECTROCARDIOGRAM COMPLETE: CPT

## 2024-08-30 NOTE — HISTORY OF PRESENT ILLNESS
[FreeTextEntry1] : Leonard Vasquez presents today to establish care.  He is an 88-year-old with recent PTCA with LUDMILA to RPDA who is feeling generally well.  [place] Before the [place] he felt suddenly as if he were going to collapse. He was escorted from the room and EMS was called. He was driven back home that night.  Then on 11/10/2023 he felt nauseated at home and went to lie down. The next morning he awoke with left shoulder pain and fatigue. He went to [facility] where he was found to have positive troponins and send urgently to the cath lab.  During his angiogram a stent was placed to the RPDA, but he was also found to have residual disease of the middle third of the LAD (75%) and the mid circumflex (70%). He is now seeking a second opinion as he is conflicted on the next appropriate course of action.  Dr. Zazueta had suggested staged PCI x 2 to the residual lesions. He was also seen at [facility] in [place] and is now pending a follow up nuclear stress test in January.   He recently had a bad case of Covid with 6-week of residual cough. His voice remains scratchy. For these symptoms he was ENT and was started on Pepcid.   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 Harinder Reynolds MD at [facility] [phone], fax [phone], attention, Lu Vasquez. Patient is maintained on Eliquis 2.5 mg BID and clopidogrel 75 mg daily. He no longer takes ASA. Currently, he has a small amount of bleeding after his second scalp biopsy. He has no cardiovascular complaints. Stress testing one month ago showed exercise capacity > 8 METS.

## 2024-08-30 NOTE — REASON FOR VISIT
[Coronary Artery Disease] : coronary artery disease [Other: _____] : [unfilled] [FreeTextEntry3] : Mark Mcclelland MD PhD [FreeTextEntry1] : 8/19/2024.  Leonard Vasquez returns today for scheduled follow up. Recently he had a Moh's procedure of the leg. As he did not stop Eliquis preoperatively, he had some significant bleeding and had to go to the ER at [facility]. Otherwise he has been feeling well. He continues to exercise regularly by using a treadmill a few times a week at 3.2 mph for 20 minutes. He does not push himself and does not get tired. He also does light weightlifting, pushups and averages 5K steps daily, on the average.

## 2024-08-30 NOTE — CARDIOLOGY SUMMARY
[de-identified] : 1/9/2024 - borderline low normal LV systolic function, LVEF 50-55% [de-identified] : 1/9/2024 exercise stress echo - 7 minutes and 34 seconds (> 8 METS), 89% MPHR, normal augmentation of systolic function [de-identified] : 11/13/2023. [facility]. [facility]. LM  Left main artery: Angiography shows minor irregularities.   Left anterior descending artery: Angiography shows severe atherosclerosis. There is a 75 % stenosis in the middle third portion of the segment.   Circumflex: Angiography shows severe atherosclerosis. There is a 70 % stenosis in the middle third portion of the segment. Right coronary artery: The vessel was visualized. The segment is average to large size, moderately calcified and mildly tortuous. Angiography shows severe atherosclerosis. There is a 99 % DeNovo stenosis after branch to RPL1. Lesion length is 12 mm. MARIO Flow 2. This is an ACC/AHA Non-High/Non C lesion for intervention.

## 2024-08-30 NOTE — DISCUSSION/SUMMARY
[EKG obtained to assist in diagnosis and management of assessed problem(s)] : EKG obtained to assist in diagnosis and management of assessed problem(s) [FreeTextEntry1] : He is an 89-year-old with history as above who returns today for scheduled follow up.  Cardiac catheterization was films have been personally reviewed showing stable LAD disease, although not well visualized.  Stress echo performed 1/9/2024 with good exercise normal augmentation of systolic function.  Continue Eliquis 2.5 mg BID and metoprolol for patient with history of paroxysmal atrial fibrillation age > 80 years, weight > 60 kg, creatinine > 1.5 mg/dL. Continue high intensity statin therapy as well. Continue beta-blocker perioperatively.  He will continue antiplatelet monotherapy without interruption. If he needs to transition from clopidogrel to ASA, he will take  mg PO x1 and then 81 mg daily thereafter.

## 2024-08-30 NOTE — REASON FOR VISIT
[Coronary Artery Disease] : coronary artery disease [Other: _____] : [unfilled] [FreeTextEntry3] : Mark Mcclelland MD PhD [FreeTextEntry1] : 8/19/2024.  Leonard Vasquez returns today for scheduled follow up. Recently he had a Moh's procedure of the leg. As he did not stop Eliquis preoperatively, he had some significant bleeding and had to go to the ER at [facility]. Otherwise he has been feeling well. He continues to exercise regularly by using a treadmill a few times a week at 3.2 mph for 20 minutes. He does not push himself and does not get tired. He also does light weightlifting, pushups and averages 5K steps daily, on the average.

## 2024-08-30 NOTE — CARDIOLOGY SUMMARY
[de-identified] : 1/9/2024 - borderline low normal LV systolic function, LVEF 50-55% [de-identified] : 1/9/2024 exercise stress echo - 7 minutes and 34 seconds (> 8 METS), 89% MPHR, normal augmentation of systolic function [de-identified] : 11/13/2023. [facility]. [facility]. LM  Left main artery: Angiography shows minor irregularities.   Left anterior descending artery: Angiography shows severe atherosclerosis. There is a 75 % stenosis in the middle third portion of the segment.   Circumflex: Angiography shows severe atherosclerosis. There is a 70 % stenosis in the middle third portion of the segment. Right coronary artery: The vessel was visualized. The segment is average to large size, moderately calcified and mildly tortuous. Angiography shows severe atherosclerosis. There is a 99 % DeNovo stenosis after branch to RPL1. Lesion length is 12 mm. MARIO Flow 2. This is an ACC/AHA Non-High/Non C lesion for intervention.

## 2024-08-30 NOTE — END OF VISIT
[Time Spent: ___ minutes] : I have spent [unfilled] minutes of time on the encounter. [FreeTextEntry3] : I, Ankit Conley MD, personally performed the evaluation and management (E/M) services for this established patient who presents today with (a) new problem(s)/exacerbation of (an) existing condition(s). That E/M includes conducting the clinically appropriate interval history &/or exam, assessing all new/exacerbated conditions, and establishing a new plan of care. Today, Margot Penaloza NP was here to observe my evaluation and management service for this new problem/exacerbated condition and follow the plan of care established by me going forward.

## 2024-08-30 NOTE — HISTORY OF PRESENT ILLNESS
[FreeTextEntry1] : Leonard Vasquez presents today to establish care.  He is an 88-year-old with recent PTCA with LUDMILA to RPDA who is feeling generally well.  [place] 10[place]19/2023 [place] Before the dinner he felt suddenly as if he were going to collapse. He was escorted from the room and EMS was called. He was driven back home that night.  Then on 11/10/2023 he felt nauseated at home and went to lie down. The next morning he awoke with left shoulder pain and fatigue. He went to [facility] where he was found to have positive troponins and send urgently to the cath lab.  During his angiogram a stent was placed to the RPDA, but he was also found to have residual disease of the middle third of the LAD (75%) and the mid circumflex (70%). He is now seeking a second opinion as he is conflicted on the next appropriate course of action.  Dr. Zazueta had suggested staged PCI x 2 to the residual lesions. He was also seen at [facility] in [place] and is now pending a follow up nuclear stress test in January.   He recently had a bad case of Covid with 6-week of residual cough. His voice remains scratchy. For these symptoms he was ENT and was started on Pepcid.   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 Harinder Reynolds MD at [facility] [phone], fax [phone], attention, Lu Vasquez. Patient is maintained on Eliquis 2.5 mg BID and clopidogrel 75 mg daily. He no longer takes ASA. Currently, he has a small amount of bleeding after his second scalp biopsy. He has no cardiovascular complaints. Stress testing one month ago showed exercise capacity > 8 METS.

## 2024-08-30 NOTE — CARDIOLOGY SUMMARY
[de-identified] : 1/9/2024 exercise stress echo - 7 minutes and 34 seconds (> 8 METS), 89% MPHR, normal augmentation of systolic function [de-identified] : 1/9/2024 - borderline low normal LV systolic function, LVEF 50-55% [de-identified] : 11/13/2023. [facility]. [facility]. LM  Left main artery: Angiography shows minor irregularities.   Left anterior descending artery: Angiography shows severe atherosclerosis. There is a 75 % stenosis in the middle third portion of the segment.   Circumflex: Angiography shows severe atherosclerosis. There is a 70 % stenosis in the middle third portion of the segment. Right coronary artery: The vessel was visualized. The segment is average to large size, moderately calcified and mildly tortuous. Angiography shows severe atherosclerosis. There is a 99 % DeNovo stenosis after branch to RPL1. Lesion length is 12 mm. MARIO Flow 2. This is an ACC/AHA Non-High/Non C lesion for intervention.

## 2024-08-30 NOTE — HISTORY OF PRESENT ILLNESS
[FreeTextEntry1] : Leonard Vasquez presents today to establish care.  He is an 88-year-old with recent PTCA with LUDMILA to RPDA who is feeling generally well.  On 10[place]19/2023 [place] Before the dinner he felt suddenly as if he were going to collapse. He was escorted from the room and EMS was called. He was driven back home that night.  Then on 11/10/2023 he felt nauseated at home and went to lie down. The next morning he awoke with left shoulder pain and fatigue. He went to [facility] where he was found to have positive troponins and send urgently to the cath lab.  During his angiogram a stent was placed to the RPDA, but he was also found to have residual disease of the middle third of the LAD (75%) and the mid circumflex (70%). He is now seeking a second opinion as he is conflicted on the next appropriate course of action.  Dr. Zazueta had suggested staged PCI x 2 to the residual lesions. He was also seen at [facility] in [place] and is now pending a follow up nuclear stress test in January.   He recently had a bad case of Covid with 6-week of residual cough. His voice remains scratchy. For these symptoms he was ENT and was started on Pepcid.   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 Harinder eRynolds MD at [facility] [phone], fax [phone], attention, Lu Vasquez. Patient is maintained on Eliquis 2.5 mg BID and clopidogrel 75 mg daily. He no longer takes ASA. Currently, he has a small amount of bleeding after his second scalp biopsy. He has no cardiovascular complaints. Stress testing one month ago showed exercise capacity > 8 METS.

## 2024-08-30 NOTE — CARDIOLOGY SUMMARY
[de-identified] : 1/9/2024 - borderline low normal LV systolic function, LVEF 50-55% [de-identified] : 1/9/2024 exercise stress echo - 7 minutes and 34 seconds (> 8 METS), 89% MPHR, normal augmentation of systolic function [de-identified] : 11/13/2023. [facility]. [facility]. LM  Left main artery: Angiography shows minor irregularities.   Left anterior descending artery: Angiography shows severe atherosclerosis. There is a 75 % stenosis in the middle third portion of the segment.   Circumflex: Angiography shows severe atherosclerosis. There is a 70 % stenosis in the middle third portion of the segment. Right coronary artery: The vessel was visualized. The segment is average to large size, moderately calcified and mildly tortuous. Angiography shows severe atherosclerosis. There is a 99 % DeNovo stenosis after branch to RPL1. Lesion length is 12 mm. MARIO Flow 2. This is an ACC/AHA Non-High/Non C lesion for intervention.

## 2024-08-30 NOTE — REASON FOR VISIT
[Coronary Artery Disease] : coronary artery disease [Other: _____] : [unfilled] [FreeTextEntry3] : Mark Mcclelland MD PhD [FreeTextEntry1] : 8/19/2024.  Leonard Vasquez returns today for scheduled follow up. Recently he had a Moh's procedure of the leg. As he did not stop Eliquis preoperatively, he had some significant bleeding and had to go to the ER at [facility]. Otherwise he has been feeling well. He continues to exercise regularly by using a treadmill a few times a week at 3.2 mph for 20 minutes. He does not push himself and does not get tired. He also does light weightlifting, pushups and averages 5K steps daily, on the average.

## 2024-08-30 NOTE — HISTORY OF PRESENT ILLNESS
[FreeTextEntry1] : Leonard Vasquez presents today to establish care.  He is an 88-year-old with recent PTCA with LUDMILA to RPDA who is feeling generally well.  On 10[place]19/2023 [place] Before the dinner he felt suddenly as if he were going to collapse. He was escorted from the room and EMS was called. He was driven back home that night.  Then on 11/10/2023 he felt nauseated at home and went to lie down. The next morning he awoke with left shoulder pain and fatigue. He went to [facility] where he was found to have positive troponins and send urgently to the cath lab.  During his angiogram a stent was placed to the RPDA, but he was also found to have residual disease of the middle third of the LAD (75%) and the mid circumflex (70%). He is now seeking a second opinion as he is conflicted on the next appropriate course of action.  Dr. Zazueta had suggested staged PCI x 2 to the residual lesions. He was also seen at [facility] in [place] and is now pending a follow up nuclear stress test in January.   He recently had a bad case of Covid with 6-week of residual cough. His voice remains scratchy. For these symptoms he was ENT and was started on Pepcid.   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 Harinder Reynolds MD at [facility] [phone], fax [phone], attention, Lu Vasquez. Patient is maintained on Eliquis 2.5 mg BID and clopidogrel 75 mg daily. He no longer takes ASA. Currently, he has a small amount of bleeding after his second scalp biopsy. He has no cardiovascular complaints. Stress testing one month ago showed exercise capacity > 8 METS.

## 2024-08-30 NOTE — REASON FOR VISIT
[Coronary Artery Disease] : coronary artery disease [Other: _____] : [unfilled] [FreeTextEntry3] : Mark Mcclelland MD PhD [FreeTextEntry1] : 8/19/2024.  Leonard Vasquez returns today for scheduled follow up. Recently he had a Moh's procedure of the leg. As he did not stop Eliquis preoperatively, he had some significant bleeding and had to go to the ER at [facility]. Otherwise he has been feeling well. He continues to exercise regularly by using a treadmill a few times a week at 3.2 mph for 20 minutes. He does not push himself and does not get tired. He also does light weightlifting, pushups and averages 5K steps daily, on the average.

## 2024-10-22 ENCOUNTER — APPOINTMENT (OUTPATIENT)
Dept: HEPATOLOGY | Facility: CLINIC | Age: 89
End: 2024-10-22
Payer: MEDICARE

## 2024-10-22 VITALS
SYSTOLIC BLOOD PRESSURE: 145 MMHG | TEMPERATURE: 97.8 F | BODY MASS INDEX: 24.11 KG/M2 | HEART RATE: 63 BPM | WEIGHT: 150 LBS | RESPIRATION RATE: 18 BRPM | HEIGHT: 66 IN | OXYGEN SATURATION: 98 % | DIASTOLIC BLOOD PRESSURE: 77 MMHG

## 2024-10-22 DIAGNOSIS — R10.9 UNSPECIFIED ABDOMINAL PAIN: ICD-10-CM

## 2024-10-22 DIAGNOSIS — E78.5 HYPERLIPIDEMIA, UNSPECIFIED: ICD-10-CM

## 2024-10-22 DIAGNOSIS — R74.8 ABNORMAL LEVELS OF OTHER SERUM ENZYMES: ICD-10-CM

## 2024-10-22 DIAGNOSIS — R10.31 RIGHT LOWER QUADRANT PAIN: ICD-10-CM

## 2024-10-22 PROCEDURE — 99214 OFFICE O/P EST MOD 30 MIN: CPT

## 2024-10-22 PROCEDURE — 99204 OFFICE O/P NEW MOD 45 MIN: CPT

## 2024-10-23 LAB
ALBUMIN SERPL ELPH-MCNC: 4.4 G/DL
ALP BLD-CCNC: 61 U/L
ALT SERPL-CCNC: 15 U/L
ANION GAP SERPL CALC-SCNC: 13 MMOL/L
AST SERPL-CCNC: 15 U/L
BILIRUB SERPL-MCNC: 0.5 MG/DL
BUN SERPL-MCNC: 25 MG/DL
CALCIUM SERPL-MCNC: 9.9 MG/DL
CEA SERPL-MCNC: 1.8 NG/ML
CHLORIDE SERPL-SCNC: 107 MMOL/L
CO2 SERPL-SCNC: 22 MMOL/L
CREAT SERPL-MCNC: 1.84 MG/DL
EGFR: 35 ML/MIN/1.73M2
GLUCOSE SERPL-MCNC: 85 MG/DL
HCT VFR BLD CALC: 48.1 %
HGB BLD-MCNC: 14.9 G/DL
MCHC RBC-ENTMCNC: 29.7 PG
MCHC RBC-ENTMCNC: 31 GM/DL
MCV RBC AUTO: 96 FL
PLATELET # BLD AUTO: 287 K/UL
POTASSIUM SERPL-SCNC: 4.7 MMOL/L
PROT SERPL-MCNC: 7.1 G/DL
RBC # BLD: 5.01 M/UL
RBC # FLD: 13.3 %
SODIUM SERPL-SCNC: 142 MMOL/L
WBC # FLD AUTO: 7.55 K/UL

## 2024-10-24 ENCOUNTER — OUTPATIENT (OUTPATIENT)
Dept: OUTPATIENT SERVICES | Facility: HOSPITAL | Age: 89
LOS: 1 days | End: 2024-10-24

## 2024-10-24 ENCOUNTER — APPOINTMENT (OUTPATIENT)
Dept: MRI IMAGING | Facility: CLINIC | Age: 89
End: 2024-10-24
Payer: MEDICARE

## 2024-10-24 PROCEDURE — 74183 MRI ABD W/O CNTR FLWD CNTR: CPT | Mod: 26,MH

## 2025-01-30 ENCOUNTER — APPOINTMENT (OUTPATIENT)
Dept: CARDIOLOGY | Facility: CLINIC | Age: 89
End: 2025-01-30
Payer: MEDICARE

## 2025-01-30 ENCOUNTER — NON-APPOINTMENT (OUTPATIENT)
Age: 89
End: 2025-01-30

## 2025-01-30 VITALS
BODY MASS INDEX: 24.91 KG/M2 | SYSTOLIC BLOOD PRESSURE: 155 MMHG | WEIGHT: 155 LBS | HEIGHT: 66 IN | HEART RATE: 55 BPM | DIASTOLIC BLOOD PRESSURE: 70 MMHG

## 2025-01-30 DIAGNOSIS — I25.10 ATHEROSCLEROTIC HEART DISEASE OF NATIVE CORONARY ARTERY W/OUT ANGINA PECTORIS: ICD-10-CM

## 2025-01-30 DIAGNOSIS — E78.5 HYPERLIPIDEMIA, UNSPECIFIED: ICD-10-CM

## 2025-01-30 DIAGNOSIS — N18.30 CHRONIC KIDNEY DISEASE, STAGE 3 UNSPECIFIED: ICD-10-CM

## 2025-01-30 DIAGNOSIS — I48.0 PAROXYSMAL ATRIAL FIBRILLATION: ICD-10-CM

## 2025-01-30 PROCEDURE — 93000 ELECTROCARDIOGRAM COMPLETE: CPT

## 2025-01-30 PROCEDURE — 99214 OFFICE O/P EST MOD 30 MIN: CPT

## 2025-01-30 PROCEDURE — G2211 COMPLEX E/M VISIT ADD ON: CPT

## 2025-01-30 RX ORDER — ATORVASTATIN CALCIUM 40 MG/1
40 TABLET, FILM COATED ORAL
Qty: 90 | Refills: 3 | Status: ACTIVE | COMMUNITY
Start: 2025-01-30 | End: 1900-01-01

## 2025-01-31 LAB
ALBUMIN SERPL ELPH-MCNC: 4.5 G/DL
ALP BLD-CCNC: 61 U/L
ALT SERPL-CCNC: 18 U/L
ANION GAP SERPL CALC-SCNC: 15 MMOL/L
AST SERPL-CCNC: 19 U/L
BASOPHILS # BLD AUTO: 0.04 K/UL
BASOPHILS NFR BLD AUTO: 0.4 %
BILIRUB SERPL-MCNC: 0.3 MG/DL
BUN SERPL-MCNC: 26 MG/DL
CALCIUM SERPL-MCNC: 9.5 MG/DL
CHLORIDE SERPL-SCNC: 102 MMOL/L
CHOLEST SERPL-MCNC: 161 MG/DL
CO2 SERPL-SCNC: 22 MMOL/L
CREAT SERPL-MCNC: 1.8 MG/DL
EGFR: 36 ML/MIN/1.73M2
EOSINOPHIL # BLD AUTO: 0.13 K/UL
EOSINOPHIL NFR BLD AUTO: 1.4 %
ESTIMATED AVERAGE GLUCOSE: 120 MG/DL
GLUCOSE SERPL-MCNC: 70 MG/DL
HBA1C MFR BLD HPLC: 5.8 %
HCT VFR BLD CALC: 45.3 %
HDLC SERPL-MCNC: 63 MG/DL
HGB BLD-MCNC: 14.5 G/DL
IMM GRANULOCYTES NFR BLD AUTO: 0.2 %
LDLC SERPL CALC-MCNC: 79 MG/DL
LYMPHOCYTES # BLD AUTO: 0.97 K/UL
LYMPHOCYTES NFR BLD AUTO: 10.1 %
MAN DIFF?: NORMAL
MCHC RBC-ENTMCNC: 29.3 PG
MCHC RBC-ENTMCNC: 32 G/DL
MCV RBC AUTO: 91.5 FL
MONOCYTES # BLD AUTO: 1.06 K/UL
MONOCYTES NFR BLD AUTO: 11.1 %
NEUTROPHILS # BLD AUTO: 7.35 K/UL
NEUTROPHILS NFR BLD AUTO: 76.8 %
NONHDLC SERPL-MCNC: 98 MG/DL
PLATELET # BLD AUTO: 299 K/UL
POTASSIUM SERPL-SCNC: 5 MMOL/L
PROT SERPL-MCNC: 6.9 G/DL
RBC # BLD: 4.95 M/UL
RBC # FLD: 14.1 %
SODIUM SERPL-SCNC: 139 MMOL/L
TRIGL SERPL-MCNC: 108 MG/DL
TSH SERPL-ACNC: 0.8 UIU/ML
WBC # FLD AUTO: 9.57 K/UL

## 2025-04-08 ENCOUNTER — RX RENEWAL (OUTPATIENT)
Age: 89
End: 2025-04-08

## 2025-05-10 ENCOUNTER — RX RENEWAL (OUTPATIENT)
Age: 89
End: 2025-05-10

## 2025-05-23 ENCOUNTER — RX RENEWAL (OUTPATIENT)
Age: 89
End: 2025-05-23

## 2025-07-30 ENCOUNTER — NON-APPOINTMENT (OUTPATIENT)
Age: 89
End: 2025-07-30

## 2025-07-31 ENCOUNTER — APPOINTMENT (OUTPATIENT)
Dept: CARDIOLOGY | Facility: CLINIC | Age: 89
End: 2025-07-31
Payer: MEDICARE

## 2025-07-31 ENCOUNTER — NON-APPOINTMENT (OUTPATIENT)
Age: 89
End: 2025-07-31

## 2025-07-31 VITALS
WEIGHT: 154 LBS | BODY MASS INDEX: 24.75 KG/M2 | SYSTOLIC BLOOD PRESSURE: 130 MMHG | HEIGHT: 66 IN | DIASTOLIC BLOOD PRESSURE: 68 MMHG | HEART RATE: 49 BPM

## 2025-07-31 DIAGNOSIS — E78.5 HYPERLIPIDEMIA, UNSPECIFIED: ICD-10-CM

## 2025-07-31 DIAGNOSIS — Z79.01 LONG TERM (CURRENT) USE OF ANTICOAGULANTS: ICD-10-CM

## 2025-07-31 DIAGNOSIS — R94.31 ABNORMAL ELECTROCARDIOGRAM [ECG] [EKG]: ICD-10-CM

## 2025-07-31 DIAGNOSIS — I25.10 ATHEROSCLEROTIC HEART DISEASE OF NATIVE CORONARY ARTERY W/OUT ANGINA PECTORIS: ICD-10-CM

## 2025-07-31 PROCEDURE — 93000 ELECTROCARDIOGRAM COMPLETE: CPT

## 2025-07-31 PROCEDURE — G2211 COMPLEX E/M VISIT ADD ON: CPT

## 2025-07-31 PROCEDURE — 99214 OFFICE O/P EST MOD 30 MIN: CPT

## 2025-08-04 ENCOUNTER — APPOINTMENT (OUTPATIENT)
Dept: INTERNAL MEDICINE | Facility: CLINIC | Age: 89
End: 2025-08-04
Payer: MEDICARE

## 2025-08-04 VITALS
TEMPERATURE: 98.6 F | SYSTOLIC BLOOD PRESSURE: 134 MMHG | OXYGEN SATURATION: 99 % | HEART RATE: 60 BPM | HEIGHT: 66 IN | DIASTOLIC BLOOD PRESSURE: 88 MMHG | WEIGHT: 152 LBS | BODY MASS INDEX: 24.43 KG/M2

## 2025-08-04 DIAGNOSIS — N18.30 CHRONIC KIDNEY DISEASE, STAGE 3 UNSPECIFIED: ICD-10-CM

## 2025-08-04 DIAGNOSIS — Z00.00 ENCOUNTER FOR GENERAL ADULT MEDICAL EXAMINATION W/OUT ABNORMAL FINDINGS: ICD-10-CM

## 2025-08-04 DIAGNOSIS — I10 ESSENTIAL (PRIMARY) HYPERTENSION: ICD-10-CM

## 2025-08-04 DIAGNOSIS — I48.0 PAROXYSMAL ATRIAL FIBRILLATION: ICD-10-CM

## 2025-08-04 DIAGNOSIS — R19.5 OTHER FECAL ABNORMALITIES: ICD-10-CM

## 2025-08-04 DIAGNOSIS — E04.1 NONTOXIC SINGLE THYROID NODULE: ICD-10-CM

## 2025-08-04 DIAGNOSIS — R10.31 RIGHT LOWER QUADRANT PAIN: ICD-10-CM

## 2025-08-04 DIAGNOSIS — Z87.828 PERSONAL HISTORY OF OTHER (HEALED) PHYSICAL INJURY AND TRAUMA: ICD-10-CM

## 2025-08-04 DIAGNOSIS — K63.5 POLYP OF COLON: ICD-10-CM

## 2025-08-04 DIAGNOSIS — K86.2 CYST OF PANCREAS: ICD-10-CM

## 2025-08-04 DIAGNOSIS — R97.20 ELEVATED PROSTATE, SPECIFIC ANTIGEN [PSA]: ICD-10-CM

## 2025-08-04 DIAGNOSIS — H40.9 UNSPECIFIED GLAUCOMA: ICD-10-CM

## 2025-08-04 DIAGNOSIS — I65.23 OCCLUSION AND STENOSIS OF BILATERAL CAROTID ARTERIES: ICD-10-CM

## 2025-08-04 DIAGNOSIS — D03.9 MELANOMA IN SITU, UNSPECIFIED: ICD-10-CM

## 2025-08-04 LAB
DATE COLLECTED: NORMAL
HEMOCCULT SP1 STL QL: NEGATIVE
QUALITY CONTROL: YES

## 2025-08-04 PROCEDURE — G0439: CPT

## 2025-08-04 PROCEDURE — G0102: CPT

## 2025-08-04 PROCEDURE — 82270 OCCULT BLOOD FECES: CPT
